# Patient Record
Sex: FEMALE | Race: WHITE | NOT HISPANIC OR LATINO | Employment: OTHER | URBAN - METROPOLITAN AREA
[De-identification: names, ages, dates, MRNs, and addresses within clinical notes are randomized per-mention and may not be internally consistent; named-entity substitution may affect disease eponyms.]

---

## 2021-11-23 ENCOUNTER — HOSPITAL ENCOUNTER (INPATIENT)
Facility: CLINIC | Age: 66
LOS: 6 days | Discharge: LEFT BEFORE DISCHARGE | DRG: 291 | End: 2021-11-30
Attending: EMERGENCY MEDICINE | Admitting: INTERNAL MEDICINE
Payer: COMMERCIAL

## 2021-11-23 ENCOUNTER — APPOINTMENT (OUTPATIENT)
Dept: CT IMAGING | Facility: CLINIC | Age: 66
DRG: 291 | End: 2021-11-23
Attending: EMERGENCY MEDICINE
Payer: COMMERCIAL

## 2021-11-23 DIAGNOSIS — R55 SYNCOPE, UNSPECIFIED SYNCOPE TYPE: ICD-10-CM

## 2021-11-23 DIAGNOSIS — L30.4 INTERTRIGO: ICD-10-CM

## 2021-11-23 DIAGNOSIS — J45.901 EXACERBATION OF ASTHMA, UNSPECIFIED ASTHMA SEVERITY, UNSPECIFIED WHETHER PERSISTENT: ICD-10-CM

## 2021-11-23 DIAGNOSIS — I50.9 ACUTE CONGESTIVE HEART FAILURE, UNSPECIFIED HEART FAILURE TYPE (H): ICD-10-CM

## 2021-11-23 DIAGNOSIS — H04.123 DRY EYES: Primary | ICD-10-CM

## 2021-11-23 LAB
ALBUMIN SERPL-MCNC: 2.6 G/DL (ref 3.4–5)
ALP SERPL-CCNC: 153 U/L (ref 40–150)
ALT SERPL W P-5'-P-CCNC: 14 U/L (ref 0–50)
ANION GAP SERPL CALCULATED.3IONS-SCNC: 5 MMOL/L (ref 3–14)
AST SERPL W P-5'-P-CCNC: 15 U/L (ref 0–45)
BASOPHILS # BLD AUTO: 0 10E3/UL (ref 0–0.2)
BASOPHILS NFR BLD AUTO: 1 %
BILIRUB SERPL-MCNC: 1.3 MG/DL (ref 0.2–1.3)
BUN SERPL-MCNC: 7 MG/DL (ref 7–30)
CALCIUM SERPL-MCNC: 8.2 MG/DL (ref 8.5–10.1)
CHLORIDE BLD-SCNC: 104 MMOL/L (ref 94–109)
CO2 SERPL-SCNC: 30 MMOL/L (ref 20–32)
CREAT SERPL-MCNC: 0.89 MG/DL (ref 0.52–1.04)
D DIMER PPP FEU-MCNC: 1.22 UG/ML FEU (ref 0–0.5)
EOSINOPHIL # BLD AUTO: 0 10E3/UL (ref 0–0.7)
EOSINOPHIL NFR BLD AUTO: 1 %
ERYTHROCYTE [DISTWIDTH] IN BLOOD BY AUTOMATED COUNT: 18.3 % (ref 10–15)
FLUAV RNA SPEC QL NAA+PROBE: NEGATIVE
FLUBV RNA RESP QL NAA+PROBE: NEGATIVE
GFR SERPL CREATININE-BSD FRML MDRD: 67 ML/MIN/1.73M2
GLUCOSE BLD-MCNC: 106 MG/DL (ref 70–99)
HCO3 BLDV-SCNC: 35 MMOL/L (ref 21–28)
HCT VFR BLD AUTO: 52.6 % (ref 35–47)
HGB BLD-MCNC: 15.1 G/DL (ref 11.7–15.7)
HOLD SPECIMEN: NORMAL
IMM GRANULOCYTES # BLD: 0 10E3/UL
IMM GRANULOCYTES NFR BLD: 0 %
LACTATE BLD-SCNC: 1.2 MMOL/L
LYMPHOCYTES # BLD AUTO: 0.4 10E3/UL (ref 0.8–5.3)
LYMPHOCYTES NFR BLD AUTO: 6 %
MCH RBC QN AUTO: 26.3 PG (ref 26.5–33)
MCHC RBC AUTO-ENTMCNC: 28.7 G/DL (ref 31.5–36.5)
MCV RBC AUTO: 92 FL (ref 78–100)
MONOCYTES # BLD AUTO: 0.2 10E3/UL (ref 0–1.3)
MONOCYTES NFR BLD AUTO: 4 %
NEUTROPHILS # BLD AUTO: 5.3 10E3/UL (ref 1.6–8.3)
NEUTROPHILS NFR BLD AUTO: 88 %
NRBC # BLD AUTO: 0 10E3/UL
NRBC BLD AUTO-RTO: 0 /100
NT-PROBNP SERPL-MCNC: 5390 PG/ML (ref 0–900)
PCO2 BLDV: 70 MM HG (ref 40–50)
PH BLDV: 7.31 [PH] (ref 7.32–7.43)
PLATELET # BLD AUTO: 242 10E3/UL (ref 150–450)
PO2 BLDV: 31 MM HG (ref 25–47)
POTASSIUM BLD-SCNC: 4.5 MMOL/L (ref 3.4–5.3)
PROT SERPL-MCNC: 7 G/DL (ref 6.8–8.8)
RBC # BLD AUTO: 5.74 10E6/UL (ref 3.8–5.2)
SAO2 % BLDV: 51 % (ref 94–100)
SARS-COV-2 RNA RESP QL NAA+PROBE: NEGATIVE
SODIUM SERPL-SCNC: 139 MMOL/L (ref 133–144)
TROPONIN I SERPL HS-MCNC: 15 NG/L
TROPONIN I SERPL-MCNC: <0.015 UG/L (ref 0–0.04)
WBC # BLD AUTO: 6 10E3/UL (ref 4–11)

## 2021-11-23 PROCEDURE — 82040 ASSAY OF SERUM ALBUMIN: CPT | Performed by: EMERGENCY MEDICINE

## 2021-11-23 PROCEDURE — 84484 ASSAY OF TROPONIN QUANT: CPT | Performed by: EMERGENCY MEDICINE

## 2021-11-23 PROCEDURE — 87181 SC STD AGAR DILUTION PER AGT: CPT | Performed by: EMERGENCY MEDICINE

## 2021-11-23 PROCEDURE — C9803 HOPD COVID-19 SPEC COLLECT: HCPCS

## 2021-11-23 PROCEDURE — 99285 EMERGENCY DEPT VISIT HI MDM: CPT | Mod: 25

## 2021-11-23 PROCEDURE — 36415 COLL VENOUS BLD VENIPUNCTURE: CPT | Performed by: EMERGENCY MEDICINE

## 2021-11-23 PROCEDURE — 83880 ASSAY OF NATRIURETIC PEPTIDE: CPT | Performed by: EMERGENCY MEDICINE

## 2021-11-23 PROCEDURE — 70450 CT HEAD/BRAIN W/O DYE: CPT | Mod: MG

## 2021-11-23 PROCEDURE — 93005 ELECTROCARDIOGRAM TRACING: CPT

## 2021-11-23 PROCEDURE — 87149 DNA/RNA DIRECT PROBE: CPT | Performed by: EMERGENCY MEDICINE

## 2021-11-23 PROCEDURE — 71275 CT ANGIOGRAPHY CHEST: CPT | Mod: ME

## 2021-11-23 PROCEDURE — 82803 BLOOD GASES ANY COMBINATION: CPT

## 2021-11-23 PROCEDURE — 85379 FIBRIN DEGRADATION QUANT: CPT | Performed by: EMERGENCY MEDICINE

## 2021-11-23 PROCEDURE — 250N000011 HC RX IP 250 OP 636: Performed by: EMERGENCY MEDICINE

## 2021-11-23 PROCEDURE — 85025 COMPLETE CBC W/AUTO DIFF WBC: CPT | Performed by: EMERGENCY MEDICINE

## 2021-11-23 PROCEDURE — 87636 SARSCOV2 & INF A&B AMP PRB: CPT | Performed by: EMERGENCY MEDICINE

## 2021-11-23 RX ORDER — IOPAMIDOL 755 MG/ML
500 INJECTION, SOLUTION INTRAVASCULAR ONCE
Status: COMPLETED | OUTPATIENT
Start: 2021-11-23 | End: 2021-11-23

## 2021-11-23 RX ORDER — IPRATROPIUM BROMIDE AND ALBUTEROL SULFATE 2.5; .5 MG/3ML; MG/3ML
6 SOLUTION RESPIRATORY (INHALATION) ONCE
Status: COMPLETED | OUTPATIENT
Start: 2021-11-23 | End: 2021-11-24

## 2021-11-23 RX ORDER — FUROSEMIDE 10 MG/ML
60 INJECTION INTRAMUSCULAR; INTRAVENOUS ONCE
Status: COMPLETED | OUTPATIENT
Start: 2021-11-23 | End: 2021-11-24

## 2021-11-23 RX ORDER — METHYLPREDNISOLONE SODIUM SUCCINATE 125 MG/2ML
125 INJECTION, POWDER, LYOPHILIZED, FOR SOLUTION INTRAMUSCULAR; INTRAVENOUS ONCE
Status: COMPLETED | OUTPATIENT
Start: 2021-11-23 | End: 2021-11-24

## 2021-11-23 RX ADMIN — IOPAMIDOL 80 ML: 755 INJECTION, SOLUTION INTRAVENOUS at 22:38

## 2021-11-24 ENCOUNTER — APPOINTMENT (OUTPATIENT)
Dept: CARDIOLOGY | Facility: CLINIC | Age: 66
DRG: 291 | End: 2021-11-24
Attending: INTERNAL MEDICINE
Payer: COMMERCIAL

## 2021-11-24 PROBLEM — I50.9 ACUTE CONGESTIVE HEART FAILURE, UNSPECIFIED HEART FAILURE TYPE (H): Status: ACTIVE | Noted: 2021-11-24

## 2021-11-24 PROBLEM — L30.4 INTERTRIGO: Status: ACTIVE | Noted: 2021-11-24

## 2021-11-24 PROBLEM — R55 SYNCOPE, UNSPECIFIED SYNCOPE TYPE: Status: ACTIVE | Noted: 2021-11-24

## 2021-11-24 PROBLEM — J45.901 EXACERBATION OF ASTHMA, UNSPECIFIED ASTHMA SEVERITY, UNSPECIFIED WHETHER PERSISTENT: Status: ACTIVE | Noted: 2021-11-24

## 2021-11-24 LAB
ANION GAP SERPL CALCULATED.3IONS-SCNC: 6 MMOL/L (ref 3–14)
ATRIAL RATE - MUSE: 94 BPM
BUN SERPL-MCNC: 9 MG/DL (ref 7–30)
CALCIUM SERPL-MCNC: 8.8 MG/DL (ref 8.5–10.1)
CHLORIDE BLD-SCNC: 102 MMOL/L (ref 94–109)
CO2 SERPL-SCNC: 32 MMOL/L (ref 20–32)
CREAT SERPL-MCNC: 0.89 MG/DL (ref 0.52–1.04)
DIASTOLIC BLOOD PRESSURE - MUSE: NORMAL MMHG
GFR SERPL CREATININE-BSD FRML MDRD: 67 ML/MIN/1.73M2
GLUCOSE BLD-MCNC: 139 MG/DL (ref 70–99)
INTERPRETATION ECG - MUSE: NORMAL
LVEF ECHO: NORMAL
P AXIS - MUSE: 67 DEGREES
POTASSIUM BLD-SCNC: 4.2 MMOL/L (ref 3.4–5.3)
PR INTERVAL - MUSE: 142 MS
QRS DURATION - MUSE: 86 MS
QT - MUSE: 364 MS
QTC - MUSE: 455 MS
R AXIS - MUSE: 90 DEGREES
SODIUM SERPL-SCNC: 140 MMOL/L (ref 133–144)
SYSTOLIC BLOOD PRESSURE - MUSE: NORMAL MMHG
T AXIS - MUSE: 38 DEGREES
VENTRICULAR RATE- MUSE: 94 BPM

## 2021-11-24 PROCEDURE — 250N000009 HC RX 250: Performed by: INTERNAL MEDICINE

## 2021-11-24 PROCEDURE — 96376 TX/PRO/DX INJ SAME DRUG ADON: CPT

## 2021-11-24 PROCEDURE — 250N000011 HC RX IP 250 OP 636: Performed by: INTERNAL MEDICINE

## 2021-11-24 PROCEDURE — 99207 PR NO BILLABLE SERVICE THIS VISIT: CPT | Mod: AI | Performed by: INTERNAL MEDICINE

## 2021-11-24 PROCEDURE — 99223 1ST HOSP IP/OBS HIGH 75: CPT | Mod: AI | Performed by: INTERNAL MEDICINE

## 2021-11-24 PROCEDURE — 94640 AIRWAY INHALATION TREATMENT: CPT

## 2021-11-24 PROCEDURE — 99231 SBSQ HOSP IP/OBS SF/LOW 25: CPT | Performed by: INTERNAL MEDICINE

## 2021-11-24 PROCEDURE — 93306 TTE W/DOPPLER COMPLETE: CPT | Mod: 26 | Performed by: INTERNAL MEDICINE

## 2021-11-24 PROCEDURE — 250N000011 HC RX IP 250 OP 636: Performed by: EMERGENCY MEDICINE

## 2021-11-24 PROCEDURE — 80048 BASIC METABOLIC PNL TOTAL CA: CPT | Performed by: INTERNAL MEDICINE

## 2021-11-24 PROCEDURE — 94640 AIRWAY INHALATION TREATMENT: CPT | Mod: 76

## 2021-11-24 PROCEDURE — 36415 COLL VENOUS BLD VENIPUNCTURE: CPT | Performed by: INTERNAL MEDICINE

## 2021-11-24 PROCEDURE — 250N000013 HC RX MED GY IP 250 OP 250 PS 637: Performed by: INTERNAL MEDICINE

## 2021-11-24 PROCEDURE — 999N000105 HC STATISTIC NO DOCUMENTATION TO SUPPORT CHARGE

## 2021-11-24 PROCEDURE — 96374 THER/PROPH/DIAG INJ IV PUSH: CPT

## 2021-11-24 PROCEDURE — 250N000009 HC RX 250: Performed by: EMERGENCY MEDICINE

## 2021-11-24 PROCEDURE — 120N000001 HC R&B MED SURG/OB

## 2021-11-24 PROCEDURE — 96375 TX/PRO/DX INJ NEW DRUG ADDON: CPT

## 2021-11-24 PROCEDURE — G0463 HOSPITAL OUTPT CLINIC VISIT: HCPCS

## 2021-11-24 PROCEDURE — 999N000157 HC STATISTIC RCP TIME EA 10 MIN

## 2021-11-24 PROCEDURE — 999N000208 ECHOCARDIOGRAM COMPLETE

## 2021-11-24 RX ORDER — ALBUTEROL SULFATE 0.83 MG/ML
2.5 SOLUTION RESPIRATORY (INHALATION)
Status: DISCONTINUED | OUTPATIENT
Start: 2021-11-24 | End: 2021-11-30 | Stop reason: HOSPADM

## 2021-11-24 RX ORDER — METHYLPREDNISOLONE SODIUM SUCCINATE 40 MG/ML
40 INJECTION, POWDER, LYOPHILIZED, FOR SOLUTION INTRAMUSCULAR; INTRAVENOUS EVERY 12 HOURS
Status: DISCONTINUED | OUTPATIENT
Start: 2021-11-24 | End: 2021-11-24

## 2021-11-24 RX ORDER — FUROSEMIDE 10 MG/ML
60 INJECTION INTRAMUSCULAR; INTRAVENOUS EVERY 8 HOURS
Status: DISCONTINUED | OUTPATIENT
Start: 2021-11-24 | End: 2021-11-25

## 2021-11-24 RX ORDER — HYDRALAZINE HYDROCHLORIDE 20 MG/ML
10 INJECTION INTRAMUSCULAR; INTRAVENOUS EVERY 4 HOURS PRN
Status: DISCONTINUED | OUTPATIENT
Start: 2021-11-24 | End: 2021-11-30 | Stop reason: HOSPADM

## 2021-11-24 RX ORDER — FUROSEMIDE 10 MG/ML
40 INJECTION INTRAMUSCULAR; INTRAVENOUS
Status: DISCONTINUED | OUTPATIENT
Start: 2021-11-24 | End: 2021-11-24

## 2021-11-24 RX ORDER — METHYLPREDNISOLONE SODIUM SUCCINATE 40 MG/ML
40 INJECTION, POWDER, LYOPHILIZED, FOR SOLUTION INTRAMUSCULAR; INTRAVENOUS EVERY 8 HOURS
Status: DISCONTINUED | OUTPATIENT
Start: 2021-11-24 | End: 2021-11-26

## 2021-11-24 RX ORDER — FLUCONAZOLE 150 MG/1
150 TABLET ORAL DAILY
Status: DISCONTINUED | OUTPATIENT
Start: 2021-11-24 | End: 2021-11-25

## 2021-11-24 RX ORDER — CODEINE PHOSPHATE AND GUAIFENESIN 10; 100 MG/5ML; MG/5ML
5 SOLUTION ORAL EVERY 4 HOURS PRN
Status: DISCONTINUED | OUTPATIENT
Start: 2021-11-24 | End: 2021-11-30 | Stop reason: HOSPADM

## 2021-11-24 RX ORDER — IPRATROPIUM BROMIDE AND ALBUTEROL SULFATE 2.5; .5 MG/3ML; MG/3ML
3 SOLUTION RESPIRATORY (INHALATION)
Status: DISCONTINUED | OUTPATIENT
Start: 2021-11-24 | End: 2021-11-30 | Stop reason: HOSPADM

## 2021-11-24 RX ORDER — ACETAMINOPHEN 650 MG/1
650 SUPPOSITORY RECTAL EVERY 6 HOURS PRN
Status: DISCONTINUED | OUTPATIENT
Start: 2021-11-24 | End: 2021-11-30 | Stop reason: HOSPADM

## 2021-11-24 RX ORDER — LIDOCAINE 40 MG/G
CREAM TOPICAL
Status: DISCONTINUED | OUTPATIENT
Start: 2021-11-24 | End: 2021-11-30 | Stop reason: HOSPADM

## 2021-11-24 RX ORDER — ACETAMINOPHEN 325 MG/1
650 TABLET ORAL EVERY 6 HOURS PRN
Status: DISCONTINUED | OUTPATIENT
Start: 2021-11-24 | End: 2021-11-30 | Stop reason: HOSPADM

## 2021-11-24 RX ORDER — HEPARIN SODIUM 5000 [USP'U]/.5ML
5000 INJECTION, SOLUTION INTRAVENOUS; SUBCUTANEOUS EVERY 12 HOURS
Status: DISCONTINUED | OUTPATIENT
Start: 2021-11-24 | End: 2021-11-30 | Stop reason: HOSPADM

## 2021-11-24 RX ORDER — NYSTATIN 100000 U/G
OINTMENT TOPICAL ONCE
Status: COMPLETED | OUTPATIENT
Start: 2021-11-24 | End: 2021-11-24

## 2021-11-24 RX ADMIN — ALBUTEROL SULFATE 2.5 MG: 2.5 SOLUTION RESPIRATORY (INHALATION) at 03:15

## 2021-11-24 RX ADMIN — FUROSEMIDE 60 MG: 10 INJECTION, SOLUTION INTRAMUSCULAR; INTRAVENOUS at 17:27

## 2021-11-24 RX ADMIN — FUROSEMIDE 40 MG: 10 INJECTION, SOLUTION INTRAMUSCULAR; INTRAVENOUS at 09:13

## 2021-11-24 RX ADMIN — IPRATROPIUM BROMIDE AND ALBUTEROL SULFATE 6 ML: .5; 3 SOLUTION RESPIRATORY (INHALATION) at 00:06

## 2021-11-24 RX ADMIN — METHYLPREDNISOLONE SODIUM SUCCINATE 125 MG: 125 INJECTION, POWDER, FOR SOLUTION INTRAMUSCULAR; INTRAVENOUS at 00:06

## 2021-11-24 RX ADMIN — IPRATROPIUM BROMIDE AND ALBUTEROL SULFATE 3 ML: 2.5; .5 SOLUTION RESPIRATORY (INHALATION) at 19:39

## 2021-11-24 RX ADMIN — METHYLPREDNISOLONE SODIUM SUCCINATE 40 MG: 40 INJECTION, POWDER, FOR SOLUTION INTRAMUSCULAR; INTRAVENOUS at 11:59

## 2021-11-24 RX ADMIN — IPRATROPIUM BROMIDE AND ALBUTEROL SULFATE 3 ML: 2.5; .5 SOLUTION RESPIRATORY (INHALATION) at 09:13

## 2021-11-24 RX ADMIN — IPRATROPIUM BROMIDE AND ALBUTEROL SULFATE 3 ML: 2.5; .5 SOLUTION RESPIRATORY (INHALATION) at 11:58

## 2021-11-24 RX ADMIN — HEPARIN SODIUM 5000 UNITS: 10000 INJECTION, SOLUTION INTRAVENOUS; SUBCUTANEOUS at 19:27

## 2021-11-24 RX ADMIN — HEPARIN SODIUM 5000 UNITS: 10000 INJECTION, SOLUTION INTRAVENOUS; SUBCUTANEOUS at 09:22

## 2021-11-24 RX ADMIN — FLUCONAZOLE 150 MG: 150 TABLET ORAL at 14:46

## 2021-11-24 RX ADMIN — IPRATROPIUM BROMIDE AND ALBUTEROL SULFATE 3 ML: 2.5; .5 SOLUTION RESPIRATORY (INHALATION) at 16:11

## 2021-11-24 RX ADMIN — NYSTATIN: 100000 OINTMENT TOPICAL at 07:59

## 2021-11-24 RX ADMIN — ALBUTEROL SULFATE 2.5 MG: 2.5 SOLUTION RESPIRATORY (INHALATION) at 07:53

## 2021-11-24 RX ADMIN — METHYLPREDNISOLONE SODIUM SUCCINATE 40 MG: 40 INJECTION, POWDER, FOR SOLUTION INTRAMUSCULAR; INTRAVENOUS at 19:27

## 2021-11-24 RX ADMIN — FUROSEMIDE 60 MG: 10 INJECTION, SOLUTION INTRAMUSCULAR; INTRAVENOUS at 00:07

## 2021-11-24 ASSESSMENT — ACTIVITIES OF DAILY LIVING (ADL)
ADLS_ACUITY_SCORE: 12
ADLS_ACUITY_SCORE: 12
ADLS_ACUITY_SCORE: 18
ADLS_ACUITY_SCORE: 20
ADLS_ACUITY_SCORE: 12
ADLS_ACUITY_SCORE: 18
ADLS_ACUITY_SCORE: 12
ADLS_ACUITY_SCORE: 12
ADLS_ACUITY_SCORE: 20
ADLS_ACUITY_SCORE: 18
ADLS_ACUITY_SCORE: 12
ADLS_ACUITY_SCORE: 20
ADLS_ACUITY_SCORE: 20
ADLS_ACUITY_SCORE: 12
ADLS_ACUITY_SCORE: 20
ADLS_ACUITY_SCORE: 18
ADLS_ACUITY_SCORE: 20
ADLS_ACUITY_SCORE: 20
ADLS_ACUITY_SCORE: 12
ADLS_ACUITY_SCORE: 18

## 2021-11-24 ASSESSMENT — ENCOUNTER SYMPTOMS
SHORTNESS OF BREATH: 1
VOMITING: 0
CHILLS: 0
COUGH: 1
FEVER: 0

## 2021-11-24 NOTE — ED NOTES
End of shift summary- 0200-0700  Dx: Syncope   A/O:x4 - drowsy   Diet: Low fat NA, No caffeine diet   Transfer: A2   Bathroom: I/O's - lasix. Urinating frequently  Pain: in legs   Treatment: Nebs, Lasix, Steroids, Heparin   Resp: Came from ED 31 on 4-6L NC, Now on 10L oxymask  Discharge Plans: tbd - from home         Blood pressure (!) 164/65, pulse 92, temperature 97.8  F (36.6  C), temperature source Oral, resp. rate 18, SpO2 94 %.

## 2021-11-24 NOTE — ED NOTES
Pt desatting into lower 80's. Bumped up to 6L, albuterol PRN given    Blood pressure (!) 160/93, pulse 92, temperature 97.8  F (36.6  C), temperature source Oral, resp. rate 9, SpO2 95 %.

## 2021-11-24 NOTE — PROGRESS NOTES
Patient was seen and examined by me today.  History and physical completed by Dr. Hutson was reviewed.  Patient is a 66-year-old female with no medical follow-up who presents with syncope.  Patient developed acute hypoxic respiratory failure, respiratory distress with evidence of emphysema and concern for diastolic congestive heart failure.  Problems    Acute respiratory with hypoxia likely multifactorial-COPD, CHF, morbid obesity    Has been on 15 L of oxygen via Oxymizer.  Manage BiPAP if she develops respiratory distress    Acute COPD exacerbation    Acute on chronic respiratory failure-hypercapnic    Acute congestive heart failure -diastolic.    Moderate to severe pulmonary hypertension likely from cor pulmonale    Morbid obesity    Difficult social situation    Plan    Continue IV Lasix, nebs, steroid, intake and output and daily weight monitoring.    Telemetry monitoring    Social service and care coordinator consult to assist with discharge needs.    Discussed with bedside nurse

## 2021-11-24 NOTE — CONSULTS
"WO Nurse Inpatient Wound Assessment   Reason for consultation: Evaluate and treat  Under breast, pannus, posterior right thigh and Left IT wounds    Assessment  Under breast and pannus wounds due to Moisture Associated Skin Damage (MASD) and Fungal  Status: initial assessment   Recommend provider order: Discussed with  MD to consider systemic Antifungal treatment   Severe fungal infection     Pressure Injury: on Right Posterior Thigh, present on admission    Pressure Injury is Stage 3   Contributing factor of the pressure injury: pressure and shear  Status: initial assessment     Pressure Injury: to Left IT,  present on admission   Pressure Injury is Stage 3  Contributing factor of the pressure injury:{Pressure and Sheer I  Status: initial assessment   Treatment Plan  Under Breast and Pannus  Wounds: BID  1 cleanse with soap and water and dry well  2. Apply thin layer of gissell Antifungal cream    Right Posterior Thigh  wounds: Daily   1. Cleanse with wound cleanser  2. Apply thin layer of  Sween  3. Cover with ABD    Left IT wounds: every 3 days   1 cleanse with wound cleanser dry  2. Apply Mepilex 4x4 dressing    Orders Written  Recommended provider order: None, at this time  WO Nurse follow-up plan:weekly  Nursing to notify the Provider(s) and re-consult the WO Nurse if wound(s) deteriorates or new skin concern.    Patient History  According to provider note(s): Annalisa Webb is a 66 year old female with history of asthma who presents via EMS with syncope and hypoxia. Patient reports of the last couple weeks she had increasing peripheral edema to point where the legs are now painful and have become a bit red. Pain is worsened by any sort of ambulation or touch. She denies any fever or nausea. She denies a history of CHF or renal disease but reports that she has not been seen by a physician in \"50 years.\"    Objective Data  Containment of urine/stool: External catheter    Active Diet Order  Orders Placed This " Encounter      Combination Diet Low Saturated Fat Na <2400mg Diet, No Caffeine Diet      Output:   I/O last 3 completed shifts:  In: -   Out: 2275 [Urine:2275]    Risk Assessment:   Sensory Perception: 3-->slightly limited  Moisture: 3-->occasionally moist  Activity: 2-->chairfast  Mobility: 2-->very limited  Nutrition: 3-->adequate  Friction and Shear: 2-->potential problem  Michael Score: 15                          Labs:   Recent Labs   Lab 11/23/21 2032   ALBUMIN 2.6*   HGB 15.1   WBC 6.0       Physical Exam  Areas of skin assessed: focused Under Breast and Pannus, Right Posterior Thigh, Left IT    Wound Location:  Under breast and pannus    Date of last photo 11/24/2021  Wound History: Patient reports she hasn't been able to care for areas well lately    Breast     Pannus groin folds extending to thighs    Wound Base: 100 % moist blanchable erythema with satellite lesions.     Palpation of the wound bed: normal      Drainage: none     Description of drainage: none     Measurements (length x width x depth, in cm) Breasts 12 cm  X 46 cm , Pannus extending to groin and anterior thighs is 16 cm x 60 cm      Tunneling none     Undermining N/A  Periwound skin: intact      Color: normal and consistent with surrounding tissue      Temperature: warm  Odor: none  Pain: absent, none  Pain intervention prior to dressing change: none     Wound Location:  Posterior Right Thigh    Date of last photo 11/24/21  Wound History: Chronic pressure injury      Wound Base: 1x1x0.2cm granular wound with scattered dry drainage surrounding over a [id number] area     Palpation of the wound bed: normal      Drainage: scant     Description of drainage: serous     Measurements (length x width x depth, in cm) see above     Tunneling N/A     Undermining N/A  Periwound skin: dry/scaly      Color: purple      Temperature: normal   Odor: none  Pain: no grimacing or signs of discomfort, none    Wound Location: Left IT     Date of last photo  11/24/21  Wound History: Chronic pressure injury    Wound Base: 50 % granulation tissue, 50% dry drainage     Palpation of the wound bed: normal      Drainage: scant     Description of drainage: serous     Measurements (length x width x depth, in cm) 4  x 4 cm area of scattered open areas      Tunneling N/A     Undermining N/A  Periwound skin: dry/scaly      Color: normal and consistent with surrounding tissue      Temperature: normal   Odor: none  Pain: absent, none  Pain intervention prior to dressing change:     Small scattered scabbed areas and agranular areas over bilateral buttocks and legs likely due to scratching and IAD.   Interventions  Visual inspection and assessment completed   Wound Care Rationale Protect periwound skin, Improve absorptive capacity, Promote moist wound healing without tissue dehydration , Provide protection  and Decrease bacterial load  Wound Care: done per plan of care  Supplies: reviewed  Current off-loading measures: Pillows under calves and Pillows  Current support surface: Standard  Atmos Air mattress-start puseate  Education provided to: importance of repositioning, plan of care, Infection prevention , Moisture management and Hygiene  Discussed plan of care with Patient, Nurse and Physician    Analy Momin RN [facility] Nursing Student     Cosigned Ramez Munroe RN CWOCN

## 2021-11-24 NOTE — ED TRIAGE NOTES
Pt presents via EMS after a syncopal episode at home. Unsure if she hit her head. Not on thinners. Complaint of generalized weakness, cough, BLE swelling and a breast infection. Found Hypoxic by EMS. 75% on RA. Currently on 6L NC at 96%. Not usually on oxygen at home. Not vaccinated for COVID.  ABCs intact A&Ox4 1 dose of albuterol given in route

## 2021-11-24 NOTE — PLAN OF CARE
Admitted to floor on 15L oxymask, O2 weaned to 2L oxymask sats 92%, otherwise VSS. A/O. Assist x2, gait belt. Tele: SR/ST. LS coarse, frequent cough. Tolerating low sodium diet. Purewick in place. Will continue POC.

## 2021-11-24 NOTE — CONSULTS
Care Management Initial Consult    General Information  Assessment completed with: Patient,    Type of CM/SW Visit: Initial Assessment    Primary Care Provider verified and updated as needed:     Readmission within the last 30 days:        Reason for Consult: community resources,discharge planning  Advance Care Planning:            Communication Assessment  Patient's communication style: spoken language (English or Bilingual)    Hearing Difficulty or Deaf: no   Wear Glasses or Blind: no    Cognitive  Cognitive/Neuro/Behavioral: WDL  Level of Consciousness: alert  Arousal Level: opens eyes spontaneously  Orientation: oriented x 4  Mood/Behavior: calm,cooperative  Best Language: 0 - No aphasia  Speech: clear    Living Environment:   People in home: alone     Current living Arrangements: apartment           Family/Social Support:  Care provided by: self  Provides care for: no one     None          Description of Support System:           Current Resources:   Patient receiving home care services: No     Community Resources: None  Equipment currently used at home: cane, straight  Supplies currently used at home:      Employment/Financial:         Lifestyle & Psychosocial Needs:  Social Determinants of Health     Tobacco Use: Not on file   Alcohol Use: Not on file   Financial Resource Strain: Not on file   Food Insecurity: Not on file   Transportation Needs: Not on file   Physical Activity: Not on file   Stress: Not on file   Social Connections: Not on file   Intimate Partner Violence: Not on file   Depression: Not on file   Housing Stability: Not on file       Functional Status:  Prior to admission patient needed assistance: no          Mental Health Status:  Mental Health Status: No Current Concerns       Chemical Dependency Status:  Chemical Dependency Status: No Current Concerns             Values/Beliefs:  Spiritual, Cultural Beliefs, Congregation Practices, Values that affect care:                 Additional  Information:  Met with pt who reports she lives alone in an apartment. She has no children, family or supports. She reports she uses a cane and has had 3 falls in the last 6 months. She reports she has been to a TCU in the past somewhere in [place] but couldn't remember the name.  She is agreeable to a TCU if needed and didn't have a preference.  She does not have any current services.      She does not have a PCP and is agreeable to letting the CC help her obtain a PCP. She would prefer a female doctor [place].    Plan:  Sw will continue to follow and assist with discharge planning.          Grazyna TALBERT, [facility]  Inpatient Care Coordination   [facility]   [phone]

## 2021-11-24 NOTE — ED PROVIDER NOTES
"  History   Chief Complaint:  Syncope       HPI   Annalisa Webb is a 66 year old female with history of asthma who presents via EMS with syncope and hypoxia. Patient reports of the last couple weeks she had increasing peripheral edema to point where the legs are now painful and have become a bit red. Pain is worsened by any sort of ambulation or touch. She denies any fever or nausea. She denies a history of CHF or renal disease but reports that she has not been seen by a physician in \"50 years.\"    Today she was walking to her mailbox when she began coughing. While coughing she suffered a syncopal episode. There was no preceding chest pain, shortness of breath or palpitations. There was no incontinence. There was no confusion upon awakening. This was unwitnessed. EMS was called and noted that her O2 saturations were 75% on room air. She states that she is \"always short of breath\" but feels no more short of breath than on average day. She has a history of asthma but no documented history of COPD. She smokes at least 1.5 packs per day    Review of Systems   Constitutional: Negative for chills and fever.   Respiratory: Positive for cough and shortness of breath.    Cardiovascular: Negative for chest pain.   Gastrointestinal: Negative for vomiting.   Skin: Positive for rash.   All other systems reviewed and are negative.    Allergies:  Prednisone  Erythromycin   Penicillins    Medications:  Abuterol    Past Medical History:     Asthma, mild intermittent   Tobacco user    Social History:  The patient was accompanied to the ER by EMS  The patient is not COVID vaccinated    Physical Exam     Patient Vitals for the past 24 hrs:   BP Temp Pulse Resp SpO2   11/23/21 2226 -- -- 85 19 98 %   11/23/21 2221 -- -- 87 23 100 %   11/23/21 2220 -- -- 86 17 100 %   11/23/21 2219 -- -- 87 19 100 %   11/23/21 2200 -- -- 86 28 97 %   11/23/21 2158 -- -- 86 25 99 %   11/23/21 2157 -- -- 87 23 94 %   11/23/21 2156 -- -- 87 22 98 % "   11/23/21 2014 (!) 165/132 98  F (36.7  C) 98 22 96 %       Physical Exam  Skin:               General:   Chronically ill female  HEENT:    Oropharynx is moist  Eyes:    Conjunctiva normal  Neck:    Supple, no meningismus.     CV:     Regular rate and rhythm.      No murmurs, rubs or gallops.       2+ radial pulses bilateral.       3+ bilateral lower extremity edema.  PULM:    Diffuse expiratory wheezing     No respiratory distress although tachypneic.      No rales     No stridor.  ABD:    Soft, non-tender, non-distended.       No pulsatile masses.       No rebound, guarding or rigidity.  MSK:     Lower extremities:      Diffuse profound edema      Poorly circumscribed erythema to lower extremities below the knees       Blanches to touch, tender and minimally warm       Erythematous changes are symmetric   LYMPH:   No cervical lymphadenopathy.  NEURO:   Alert. Good muscle tone, no atrophy.  Skin:    Warm, dry    Psych:    Mood is good and affect is appropriate.        Emergency Department Course   ECG  ECG obtained at 2058, ECG read at 2105  Normal sinus rhythm  Rightward axis  Borderline ECG   Rate 94 bpm. NE interval 142 ms. QRS duration 86 ms. QT/QTc 364/455 ms. P-R-T axes 67 90 38.     Imaging:    POC US ECHO LIMITED   Final Result   [facility] Procedure Note        Limited Bedside ED Cardiac Ultrasound:      PROCEDURE: PERFORMED BY: Dr. Nic Brito MD   INDICATIONS/SYMPTOM:  Hypoxia   PROBE: Cardiac phased array probe   BODY LOCATION: Chest   FINDINGS:    The ultrasound was performed utilizing the apical 4 chamber views.   Cardiac contractility:  Present   Gross estimation of cardiac kinesis: Technically difficult and unable to assess   Pericardial Effusion:  None   RV:LV ratio: LV > RV                                                     INTERPRETATION:    Technically difficult bedside ultrasound. No pericardial effusion. Unable to assess cardiac kinesis.    IMAGE DOCUMENTATION: Images were  archived to hard drive.              CT Chest Pulmonary Embolism w Contrast   Final Result   IMPRESSION:   1.  No acute pulmonary embolus.   2.  2 small bands of old retracted pulmonary embolus.   3.  No aortic aneurysm or dissection.   4.  Few areas of air trapping in the lungs bilaterally consistent with small airways disease.   5.  Few mildly enlarged right bilateral axillary lymph nodes.   6.  Cholelithiasis.      Head CT w/o contrast   Final Result   IMPRESSION:   1.  No CT evidence for acute intracranial hemorrhage or calvarial fracture.   2.  Chronic changes as above.        The above imaging workup was performed.   Report per radiology    Laboratory:  Labs Ordered and Resulted from Time of ED Arrival to Time of ED Departure   D DIMER QUANTITATIVE - Abnormal       Result Value    D-Dimer Quantitative 1.22 (*)    COMPREHENSIVE METABOLIC PANEL - Abnormal    Sodium 139      Potassium 4.5      Chloride 104      Carbon Dioxide (CO2) 30      Anion Gap 5      Urea Nitrogen 7      Creatinine 0.89      Calcium 8.2 (*)     Glucose 106 (*)     Alkaline Phosphatase 153 (*)     AST 15      ALT 14      Protein Total 7.0      Albumin 2.6 (*)     Bilirubin Total 1.3      GFR Estimate 67     NT PROBNP INPATIENT - Abnormal    N terminal Pro BNP Inpatient 5,390 (*)    CBC WITH PLATELETS AND DIFFERENTIAL - Abnormal    WBC Count 6.0      RBC Count 5.74 (*)     Hemoglobin 15.1      Hematocrit 52.6 (*)     MCV 92      MCH 26.3 (*)     MCHC 28.7 (*)     RDW 18.3 (*)     Platelet Count 242      % Neutrophils 88      % Lymphocytes 6      % Monocytes 4      % Eosinophils 1      % Basophils 1      % Immature Granulocytes 0      NRBCs per 100 WBC 0      Absolute Neutrophils 5.3      Absolute Lymphocytes 0.4 (*)     Absolute Monocytes 0.2      Absolute Eosinophils 0.0      Absolute Basophils 0.0      Absolute Immature Granulocytes 0.0      Absolute NRBCs 0.0     ISTAT GASES LACTATE VENOUS POCT - Abnormal    Lactic Acid POCT 1.2       Bicarbonate Venous POCT 35 (*)     O2 Sat, Venous POCT 51 (*)     pCO2V Venous POCT 70 (*)     pH Venous POCT 7.31 (*)     pO2 Venous POCT 31     INFLUENZA A/B & SARS-COV2 PCR MULTIPLEX - Normal    Influenza A PCR Negative      Influenza B PCR Negative      SARS CoV2 PCR Negative     TROPONIN I - Normal    Troponin I S 15     TROPONIN I - Normal    Troponin I <0.015     BLOOD CULTURE   BLOOD CULTURE      Emergency Department Course:  Reviewed:  I reviewed nursing notes, vitals and past medical history    Assessments:   I obtained history and examined the patient as noted above.    I rechecked the patient and explained findings.     Consults:   I spoke with Dr. Hutson of the hospitalist service from [facility] regarding patient's presentation, findings, and plan of care.    Interventions:  Lasix, 60 mg, IV  Duoneb, 6 mL, Nebulization  Methylprednisolone, 125 mg, IV    Disposition:  The patient was admitted to the hospital under the care of Dr. Hutson.     Impression & Plan     Medical Decision Makin-year-old female presented to the ED with syncopal episode but noted to be hypoxic on presentation. In regards to her hypoxia, she has clear bronchospasm. She has a history of asthma but likely has contributing COPD given her extensive tobacco history. Patient given bronchodilators and steroids. She is hypoxic as low as 84% and is requiring 3 L per nasal cannula. COVID swab negative. D-dimer elevated thus underwent CT scan of the chest which has ruled out PE, pneumonia, pneumothorax and pleural effusions.    In addition, she has signs of volume overload. BNP quite elevated. Bedside ultrasound was quite limited in its ability to evaluate for ejection fraction. I suspect she has heart failure likely contributing to her peripheral edema. Patient given IV Lasix. She does have erythematous patches of the lower extremities which are most consistent with volume dependent skin changes as opposed to developing  cellulitis.    In regards to her syncope, no evidence of cardiac conduction defects or dysrhythmia during ED course. CT scan without PE and aortic pathology. I believe syncope likely related to hypoxia and posttussive syncope    Patient also noted to have quite severe Candida skin changes to the inframammary area. Nystatin applied. Patient will be transferred to a monitored bed for further eval.    Diagnosis:    ICD-10-CM    1. Exacerbation of asthma, unspecified asthma severity, unspecified whether persistent  J45.901    2. Acute congestive heart failure, unspecified heart failure type (H)  I50.9    3. Syncope, unspecified syncope type  R55    4. Intertrigo  L30.4        Discharge Medications:  New Prescriptions    No medications on file       Scribe Disclosure:  I, Javon Beasley () and Clifford Henson (trainee), am serving as a scribe at 8:27 PM on 11/23/2021 to document services personally performed by Nic Brito MD based on my observations and the provider's statements to me.        Nic Brito MD  11/24/21 0033

## 2021-11-24 NOTE — PHARMACY-ADMISSION MEDICATION HISTORY
Admission medication history interview status for this patient is complete. See [facility] admission navigator for allergy information, prior to admission medications and immunization status.     Medication history interview done, indicate source(s): Patient  Medication history resources (including written lists, pill bottles, clinic record):None  Pharmacy: [facility]    Changes made to PTA medication list:  Added:   Changed:   Reported as Not Taking:   Removed: Albuterol Inhaler    Actions taken by pharmacist (provider contacted, etc):None     Additional medication history information:None    Medication reconciliation/reorder completed by provider prior to medication history?  No      Patient reports taking no meds at home.

## 2021-11-24 NOTE — H&P
Admitted: 2021    CHIEF COMPLAINT:  Syncope.    HISTORY OF PRESENT ILLNESS:  Mainly obtained from the patient.  This is a 66-year-old white female with a history of asthma who really has not had any medical care for the past 40 years and is not on any medications.  The patient apparently went to get her mail, which is maybe around 12-15 feet from her place of residence.  When she was getting back, she had a coughing fit and subsequently had a syncopal episode.  She denies any chest pain.  She has been having swelling in her legs, which has been getting progressively worse.  She denies any fevers or chills.  She does endorse shortness of breath, which has been progressively getting worse for a couple of years.  She has really not seen any physician for any of these complaints.  She also does endorse some orthopnea and possibly some PND.  She also does endorse some weight gain, though she is unable to quantify.  She has had at least 2 other episodes of syncope this year, though she is unable to give me a description.  She was seen by Dr. Brito here in the ER, and I discussed care with him.  I am asked to admit her for further evaluation.    PAST MEDICAL HISTORY:  Asthma.    PAST SURGICAL HISTORY:  Significant for surgery on the right arm.    MEDICATIONS:  She has not taken any medications for the past 20 years, to include her albuterol inhaler.    SOCIAL HISTORY:  She continues to smoke.  She smokes 1-1/2 packs and has been doing that for 40 years.  She lives alone.  She does not drink alcohol.    FAMILY HISTORY:  Significant for father who  from an MI.    REVIEW OF SYSTEMS:  She denies any active chest pain.  She denies any nausea or vomiting or diarrhea.  All other systems are reviewed and deemed unremarkable and negative.    PHYSICAL EXAMINATION:    VITAL SIGNS:  Her temperature is 98.  Her pulse is 65.  Her blood pressure is 165/132.  her respiratory rate is 26 with an O2 saturation of 92% on 6  liters.  GENERAL:  She is alert, awake, oriented x 3, disheveled and unkempt, in no acute distress, is quite obese.  HEENT:  Pupils equal, round, reactive to light and accommodative.  LUNGS:  Clear to auscultation bilaterally.  HEART:  Regular rate.  S1, S2 normal.  No murmurs or gallops.  ABDOMEN:  Soft, nontender, nondistended with good bowel sounds.  EXTREMITIES:  She has 2+ edema and erythema of her lower extremities.  She has decreased range of motion of her lower extremities, which she attributes to arthritis and walks with a cane.    SKIN:  She has intertrigo between her breasts.  LABORATORY DATA:  Lab work obtained here included a CMP which is grossly unremarkable other than an alkaline phosphatase of 153.  Her BNP is 5390.  Her troponin is less than 0.015.  Her lactic acid is 1.2.  A venous blood gas showed a pH of 7.31 with a pCO2 of 70.  On her CBC, her white cell count is 6.0, hemoglobin 15.1, hematocrit 52.6, platelet count of 262.  Her lymphocyte count is 0.4.  Her D-dimer was 1.22.  Blood cultures obtained in the ER are pending.  Fast COVID test as well as influenza test was read as negative.  CT of the head without contrast showed no CT evidence of acute intracranial hemorrhage or calvarial fracture, chronic changes.  She had a CT of the chest PE protocol, which showed the following:  No acute pulmonary embolus, 2 small bands of old, retracted pulmonary embolus, no aortic aneurysm or dissection, a few areas of airtrapping in the lungs consistent with small airway disease, few enlarged right bilateral axillary lymph nodes, cholelithiasis.  An EKG obtained on her shows the following:  Normal sinus rhythm at 94 beats per minute.    IMPRESSION AND PLAN:    1.  Syncope, etiology undetermined:  We will monitor her on telemetry.  Could be related to her coughing fit and vasovagal episode or neurocardiogenic.  2.  Acute hypoxic respiratory failure:  Her O2 saturation was noted to be 75% on room air by EMS.   She is on supplemental oxygen here.  I think this is likely multifactorial, to include obesity with possibly pulmonary hypertension, COPD and congestive heart failure.  We will treat her by giving her steroids for now along with breathing treatments to include DuoNebs.  We will monitor on telemetry.  We will get an echocardiogram.  We will empirically diurese her with IV Lasix.  3.  Difficult social situation:  The patient lives alone, has difficulty obtaining groceries, is disheveled and has had no medical care.  We will get  involved.    CODE STATUS:  DNR/DNI.    She will be admitted as an inpatient.    Garry Hutson MD        D: 2021   T: 2021   MT: CHERYL    Name:     MICHAEL HESTER  MRN:      [id number]        Account:     [id number]   :      1955           Admitted:    2021       Document: [id number]

## 2021-11-25 ENCOUNTER — APPOINTMENT (OUTPATIENT)
Dept: PHYSICAL THERAPY | Facility: CLINIC | Age: 66
DRG: 291 | End: 2021-11-25
Attending: INTERNAL MEDICINE
Payer: COMMERCIAL

## 2021-11-25 LAB
ANION GAP SERPL CALCULATED.3IONS-SCNC: 3 MMOL/L (ref 3–14)
BASOPHILS # BLD AUTO: 0 10E3/UL (ref 0–0.2)
BASOPHILS NFR BLD AUTO: 0 %
BUN SERPL-MCNC: 20 MG/DL (ref 7–30)
CALCIUM SERPL-MCNC: 7.9 MG/DL (ref 8.5–10.1)
CHLORIDE BLD-SCNC: 99 MMOL/L (ref 94–109)
CO2 SERPL-SCNC: 38 MMOL/L (ref 20–32)
CREAT SERPL-MCNC: 1.16 MG/DL (ref 0.52–1.04)
ENTEROCOCCUS FAECALIS: NOT DETECTED
ENTEROCOCCUS FAECIUM: NOT DETECTED
EOSINOPHIL # BLD AUTO: 0 10E3/UL (ref 0–0.7)
EOSINOPHIL NFR BLD AUTO: 0 %
ERYTHROCYTE [DISTWIDTH] IN BLOOD BY AUTOMATED COUNT: 18.1 % (ref 10–15)
GFR SERPL CREATININE-BSD FRML MDRD: 48 ML/MIN/1.73M2
GLUCOSE BLD-MCNC: 152 MG/DL (ref 70–99)
HCT VFR BLD AUTO: 50.9 % (ref 35–47)
HGB BLD-MCNC: 14.5 G/DL (ref 11.7–15.7)
IMM GRANULOCYTES # BLD: 0 10E3/UL
IMM GRANULOCYTES NFR BLD: 0 %
LACTATE SERPL-SCNC: 2.7 MMOL/L (ref 0.7–2)
LISTERIA SPECIES (DETECTED/NOT DETECTED): NOT DETECTED
LYMPHOCYTES # BLD AUTO: 0.1 10E3/UL (ref 0.8–5.3)
LYMPHOCYTES NFR BLD AUTO: 2 %
MCH RBC QN AUTO: 26.5 PG (ref 26.5–33)
MCHC RBC AUTO-ENTMCNC: 28.5 G/DL (ref 31.5–36.5)
MCV RBC AUTO: 93 FL (ref 78–100)
MONOCYTES # BLD AUTO: 0.1 10E3/UL (ref 0–1.3)
MONOCYTES NFR BLD AUTO: 1 %
NEUTROPHILS # BLD AUTO: 5.3 10E3/UL (ref 1.6–8.3)
NEUTROPHILS NFR BLD AUTO: 97 %
NRBC # BLD AUTO: 0 10E3/UL
NRBC BLD AUTO-RTO: 0 /100
PLATELET # BLD AUTO: 223 10E3/UL (ref 150–450)
POTASSIUM BLD-SCNC: 4 MMOL/L (ref 3.4–5.3)
RBC # BLD AUTO: 5.47 10E6/UL (ref 3.8–5.2)
SODIUM SERPL-SCNC: 140 MMOL/L (ref 133–144)
STAPHYLOCOCCUS AUREUS: NOT DETECTED
STAPHYLOCOCCUS EPIDERMIDIS: NOT DETECTED
STAPHYLOCOCCUS LUGDUNENSIS: NOT DETECTED
STAPHYLOCOCCUS SPECIES: NOT DETECTED
STREPTOCOCCUS AGALACTIAE: NOT DETECTED
STREPTOCOCCUS ANGINOSUS GROUP: NOT DETECTED
STREPTOCOCCUS PNEUMONIAE: NOT DETECTED
STREPTOCOCCUS PYOGENES: NOT DETECTED
STREPTOCOCCUS SPECIES: NOT DETECTED
WBC # BLD AUTO: 5.6 10E3/UL (ref 4–11)

## 2021-11-25 PROCEDURE — 999N000157 HC STATISTIC RCP TIME EA 10 MIN

## 2021-11-25 PROCEDURE — 99233 SBSQ HOSP IP/OBS HIGH 50: CPT | Performed by: INTERNAL MEDICINE

## 2021-11-25 PROCEDURE — 83605 ASSAY OF LACTIC ACID: CPT | Performed by: INTERNAL MEDICINE

## 2021-11-25 PROCEDURE — 250N000011 HC RX IP 250 OP 636: Performed by: INTERNAL MEDICINE

## 2021-11-25 PROCEDURE — 94640 AIRWAY INHALATION TREATMENT: CPT | Mod: 76

## 2021-11-25 PROCEDURE — 250N000009 HC RX 250: Performed by: INTERNAL MEDICINE

## 2021-11-25 PROCEDURE — 97530 THERAPEUTIC ACTIVITIES: CPT | Mod: GP | Performed by: PHYSICAL THERAPIST

## 2021-11-25 PROCEDURE — 120N000001 HC R&B MED SURG/OB

## 2021-11-25 PROCEDURE — 36415 COLL VENOUS BLD VENIPUNCTURE: CPT | Performed by: INTERNAL MEDICINE

## 2021-11-25 PROCEDURE — 94640 AIRWAY INHALATION TREATMENT: CPT

## 2021-11-25 PROCEDURE — 97162 PT EVAL MOD COMPLEX 30 MIN: CPT | Mod: GP | Performed by: PHYSICAL THERAPIST

## 2021-11-25 PROCEDURE — 85025 COMPLETE CBC W/AUTO DIFF WBC: CPT | Performed by: INTERNAL MEDICINE

## 2021-11-25 PROCEDURE — 80048 BASIC METABOLIC PNL TOTAL CA: CPT | Performed by: INTERNAL MEDICINE

## 2021-11-25 PROCEDURE — 250N000013 HC RX MED GY IP 250 OP 250 PS 637: Performed by: INTERNAL MEDICINE

## 2021-11-25 RX ORDER — FUROSEMIDE 10 MG/ML
40 INJECTION INTRAMUSCULAR; INTRAVENOUS EVERY 8 HOURS
Status: DISCONTINUED | OUTPATIENT
Start: 2021-11-25 | End: 2021-11-27

## 2021-11-25 RX ORDER — FLUCONAZOLE 150 MG/1
150 TABLET ORAL
Status: DISCONTINUED | OUTPATIENT
Start: 2021-12-02 | End: 2021-11-30 | Stop reason: HOSPADM

## 2021-11-25 RX ADMIN — FLUCONAZOLE 150 MG: 150 TABLET ORAL at 09:50

## 2021-11-25 RX ADMIN — IPRATROPIUM BROMIDE AND ALBUTEROL SULFATE 3 ML: 2.5; .5 SOLUTION RESPIRATORY (INHALATION) at 15:12

## 2021-11-25 RX ADMIN — METHYLPREDNISOLONE SODIUM SUCCINATE 40 MG: 40 INJECTION, POWDER, FOR SOLUTION INTRAMUSCULAR; INTRAVENOUS at 06:26

## 2021-11-25 RX ADMIN — GUAIFENESIN AND CODEINE PHOSPHATE 5 ML: 10; 100 LIQUID ORAL at 00:27

## 2021-11-25 RX ADMIN — IPRATROPIUM BROMIDE AND ALBUTEROL SULFATE 3 ML: 2.5; .5 SOLUTION RESPIRATORY (INHALATION) at 11:27

## 2021-11-25 RX ADMIN — METHYLPREDNISOLONE SODIUM SUCCINATE 40 MG: 40 INJECTION, POWDER, FOR SOLUTION INTRAMUSCULAR; INTRAVENOUS at 21:00

## 2021-11-25 RX ADMIN — METHYLPREDNISOLONE SODIUM SUCCINATE 40 MG: 40 INJECTION, POWDER, FOR SOLUTION INTRAMUSCULAR; INTRAVENOUS at 13:23

## 2021-11-25 RX ADMIN — FUROSEMIDE 40 MG: 10 INJECTION, SOLUTION INTRAMUSCULAR; INTRAVENOUS at 16:45

## 2021-11-25 RX ADMIN — HEPARIN SODIUM 5000 UNITS: 10000 INJECTION, SOLUTION INTRAVENOUS; SUBCUTANEOUS at 21:26

## 2021-11-25 RX ADMIN — HEPARIN SODIUM 5000 UNITS: 10000 INJECTION, SOLUTION INTRAVENOUS; SUBCUTANEOUS at 09:48

## 2021-11-25 RX ADMIN — IPRATROPIUM BROMIDE AND ALBUTEROL SULFATE 3 ML: 2.5; .5 SOLUTION RESPIRATORY (INHALATION) at 07:21

## 2021-11-25 RX ADMIN — FUROSEMIDE 60 MG: 10 INJECTION, SOLUTION INTRAMUSCULAR; INTRAVENOUS at 09:49

## 2021-11-25 RX ADMIN — FUROSEMIDE 60 MG: 10 INJECTION, SOLUTION INTRAMUSCULAR; INTRAVENOUS at 01:00

## 2021-11-25 ASSESSMENT — ACTIVITIES OF DAILY LIVING (ADL)
ADLS_ACUITY_SCORE: 12
ADLS_ACUITY_SCORE: 8
ADLS_ACUITY_SCORE: 12
ADLS_ACUITY_SCORE: 10
ADLS_ACUITY_SCORE: 12
ADLS_ACUITY_SCORE: 10
ADLS_ACUITY_SCORE: 10
ADLS_ACUITY_SCORE: 12
ADLS_ACUITY_SCORE: 8
ADLS_ACUITY_SCORE: 12
ADLS_ACUITY_SCORE: 12
ADLS_ACUITY_SCORE: 8
ADLS_ACUITY_SCORE: 12
ADLS_ACUITY_SCORE: 12
ADLS_ACUITY_SCORE: 10

## 2021-11-25 NOTE — PROGRESS NOTES
SPIRITUAL HEALTH SERVICES Progress Note  [facility] 3    Spiritual Health Services consult order for assistance with healthcare directive.  Provided informational resources and education related to directive.  Assisted pt in completing document. Pt has no healthcare agent.    Today is a holiday and so notary service is not available.      Plan: Will attempt to schedule notary services tomorrow.  [facility] is available for additional consultation/assistance with healthcare directive.    Stoney George MA  Staff   Pager: [phone]  Phone: *[phone]  Off Mondays

## 2021-11-25 NOTE — PROGRESS NOTES
11/25/21 1330   Quick Adds   Type of Visit Initial PT Evaluation   Living Environment   Living Environment Comments Pt lives in an apartment, 1st floor. No stairs manage, has ramp entrance. Typically uses 2 cane for mobility all the time the past three month d/t increased LE pain.   Self-Care   Usual Activity Tolerance moderate   Current Activity Tolerance fair   Equipment Currently Used at Home cane, straight;tub bench;raised toilet seat   Disability/Function   Fall history within last six months yes   Number of times patient has fallen within last six months 3   General Information   Onset of Illness/Injury or Date of Surgery 11/23/21   Referring Physician Jorge Jeronimo MD   Patient/Family Therapy Goals Statement (PT) To return home   Pertinent History of Current Problem (include personal factors and/or comorbidities that impact the POC) Pt is a 66-year-old female with no medical follow-up who presents with syncope.  Patient developed acute hypoxic respiratory failure, respiratory distress with evidence of emphysema and concern for diastolic congestive heart failure.   Weight-Bearing Status - LLE full weight-bearing   Weight-Bearing Status - RLE full weight-bearing   Cognition   Orientation Status (Cognition) oriented x 4   Affect/Mental Status (Cognition) WFL   Follows Commands (Cognition) WFL   Pain Assessment   Patient Currently in Pain Yes, see Vital Sign flowsheet  (B LE pressure in standing)   Range of Motion (ROM)   ROM Comment B LE WFL   Strength   Strength Comments able to complete B SLR   Bed Mobility   Comment (Bed Mobility) Jana  supine <> sit   Transfers   Transfer Safety Comments Jana sit <> stand with FWW   Gait/Stairs (Locomotion)   Distance in Feet (Required for LE Total Joints) 10   Pattern (Gait) step-to   Deviations/Abnormal Patterns (Gait) base of support, wide;antalgic;gait speed decreased;weight shifting decreased   Comment (Gait/Stairs) CGA with FWW   Balance   Balance Comments good  sitting balance, fair standing with FWW   Clinical Impression   Criteria for Skilled Therapeutic Intervention yes, treatment indicated   PT Diagnosis (PT) impaired functional mobility   Influenced by the following impairments decreased standing balance; LE strength   Functional limitations due to impairments impaired ambulation, transfers, bed mobility   Clinical Presentation Evolving/Changing   Clinical Presentation Rationale Pt's respiratory needs, functional status, medical history, limited social support   Clinical Decision Making (Complexity) moderate complexity   Therapy Frequency (PT) 5x/week   Predicted Duration of Therapy Intervention (days/wks) 3 days   Planned Therapy Interventions (PT) balance training;bed mobility training;gait training;home exercise program;patient/family education;strengthening;transfer training   Anticipated Equipment Needs at Discharge (PT) walker, rolling   Risk & Benefits of therapy have been explained evaluation/treatment results reviewed;care plan/treatment goals reviewed;risks/benefits reviewed;current/potential barriers reviewed;participants voiced agreement with care plan;participants included;patient   PT Discharge Planning    PT Discharge Recommendation (DC Rec) Transitional Care Facility;home with home care physical therapy;home with assist   PT Rationale for DC Rec Pt is below baseline level of mobility, would benefit from continued skilled PT services in TCU setting. Pt lives alone with no social support. Currently needing Ax1 with all mobility, would need this to return home safely at this time. If patient d/c home, would benefit from HHPT and use of FWW for all mobility.    PT Brief overview of current status  Ax1 with FWW   Total Evaluation Time   Total Evaluation Time (Minutes) 8

## 2021-11-25 NOTE — PLAN OF CARE
VSS. SPO2 94-98% Oxygen has been adjusted few times on this shift Pt  drops when asleep now on 5 LPM NC. LS: clear/Diminished. Tele: SR. Afebrile. Infrequent cough with episodes of coughing fits. Denies pain.BLE edema +3.  A&OX4. UP A2 with Rosa steady. Pt redness/yeast /rash to perineum/abdominal folds/ under the breasts cleaned antifungal cream applied. Wound to right posterior thigh  Cleaned with wound cleanser dressing applied, wound to left buttock mepilex applied. Pulsate mattress in place, Pt on lasix Pure wick in place. PIV to the right arm SL. Continue monitoring & POC.

## 2021-11-25 NOTE — PLAN OF CARE
Orientation:  X4  VS: WDL.    Pain: Denies  Activity: Stand by   Resp: Oxygen increased to 7lpm. SpO2 dropped to low 80's while sleeping. Unproductive cough.   GI:  WDL  : WDL    Lines: SL  Plan:   Discharge TBD

## 2021-11-25 NOTE — PLAN OF CARE
Pt is stable on 8L oxymizer today.  She is tolerating all meals. Cough can be broncho spastic at times.  Offered Robitussin, pt declined.  Up to chair after  working with PT.  SW follows for TCU possibility.  BLEs are red-tender.  Will continue to monitor patient and continue plan with nebs, steroids, lasix.  Pt has good urine output. Tele was SR.  /75 (BP Location: Left arm)   Pulse 98   Temp 96.9  F (36.1  C) (Oral)   Resp 22   Wt 120.1 kg (264 lb 12.8 oz)   SpO2 95%

## 2021-11-25 NOTE — PROGRESS NOTES
MD Critical Lab Acknowledgement    I was contacted for critical Diagnostic Studies and Labs value:    Lactate of 2.7  Abnormal result acknowledged.    EMR reviewed     Assessment/Problem :    Elevated lactate likely due to nebs     Plan/Recommendation:    Continue to monitor

## 2021-11-25 NOTE — PLAN OF CARE
Pt A/O x 4 and calm/cooperative and friendly. Denies pain. 02 at 7 L while sleeping due to desaturation. Tele SR. SBA. LS clear and dim. Continue POC.    Vitals: /57 (BP Location: Left arm)   Pulse 89   Temp (!) 96.5  F (35.8  C) (Axillary)   Resp 20   Wt 120.1 kg (264 lb 12.8 oz)   SpO2 97%   BMI= There is no height or weight on file to calculate BMI.

## 2021-11-26 ENCOUNTER — APPOINTMENT (OUTPATIENT)
Dept: PHYSICAL THERAPY | Facility: CLINIC | Age: 66
DRG: 291 | End: 2021-11-26
Payer: COMMERCIAL

## 2021-11-26 LAB
ALBUMIN SERPL-MCNC: 2.9 G/DL (ref 3.4–5)
ALP SERPL-CCNC: 124 U/L (ref 40–150)
ALT SERPL W P-5'-P-CCNC: 13 U/L (ref 0–50)
ANION GAP SERPL CALCULATED.3IONS-SCNC: 4 MMOL/L (ref 3–14)
AST SERPL W P-5'-P-CCNC: 13 U/L (ref 0–45)
BILIRUB SERPL-MCNC: 0.9 MG/DL (ref 0.2–1.3)
BUN SERPL-MCNC: 35 MG/DL (ref 7–30)
CALCIUM SERPL-MCNC: 8.2 MG/DL (ref 8.5–10.1)
CHLORIDE BLD-SCNC: 94 MMOL/L (ref 94–109)
CO2 SERPL-SCNC: 41 MMOL/L (ref 20–32)
CREAT SERPL-MCNC: 1.15 MG/DL (ref 0.52–1.04)
ERYTHROCYTE [DISTWIDTH] IN BLOOD BY AUTOMATED COUNT: 18.6 % (ref 10–15)
GFR SERPL CREATININE-BSD FRML MDRD: 49 ML/MIN/1.73M2
GLUCOSE BLD-MCNC: 147 MG/DL (ref 70–99)
HCT VFR BLD AUTO: 52.5 % (ref 35–47)
HGB BLD-MCNC: 15.3 G/DL (ref 11.7–15.7)
MCH RBC QN AUTO: 26.7 PG (ref 26.5–33)
MCHC RBC AUTO-ENTMCNC: 29.1 G/DL (ref 31.5–36.5)
MCV RBC AUTO: 92 FL (ref 78–100)
PLATELET # BLD AUTO: 234 10E3/UL (ref 150–450)
POTASSIUM BLD-SCNC: 3.7 MMOL/L (ref 3.4–5.3)
PROT SERPL-MCNC: 7.3 G/DL (ref 6.8–8.8)
RBC # BLD AUTO: 5.73 10E6/UL (ref 3.8–5.2)
SODIUM SERPL-SCNC: 139 MMOL/L (ref 133–144)
WBC # BLD AUTO: 4.2 10E3/UL (ref 4–11)

## 2021-11-26 PROCEDURE — 80053 COMPREHEN METABOLIC PANEL: CPT | Performed by: INTERNAL MEDICINE

## 2021-11-26 PROCEDURE — 250N000009 HC RX 250: Performed by: INTERNAL MEDICINE

## 2021-11-26 PROCEDURE — 87040 BLOOD CULTURE FOR BACTERIA: CPT | Performed by: INTERNAL MEDICINE

## 2021-11-26 PROCEDURE — 250N000013 HC RX MED GY IP 250 OP 250 PS 637: Performed by: INTERNAL MEDICINE

## 2021-11-26 PROCEDURE — 999N000157 HC STATISTIC RCP TIME EA 10 MIN

## 2021-11-26 PROCEDURE — 250N000012 HC RX MED GY IP 250 OP 636 PS 637: Performed by: INTERNAL MEDICINE

## 2021-11-26 PROCEDURE — 97530 THERAPEUTIC ACTIVITIES: CPT | Mod: GP | Performed by: PHYSICAL THERAPIST

## 2021-11-26 PROCEDURE — 94640 AIRWAY INHALATION TREATMENT: CPT | Mod: 76

## 2021-11-26 PROCEDURE — 85027 COMPLETE CBC AUTOMATED: CPT | Performed by: INTERNAL MEDICINE

## 2021-11-26 PROCEDURE — 94640 AIRWAY INHALATION TREATMENT: CPT

## 2021-11-26 PROCEDURE — 250N000011 HC RX IP 250 OP 636: Performed by: INTERNAL MEDICINE

## 2021-11-26 PROCEDURE — 120N000001 HC R&B MED SURG/OB

## 2021-11-26 PROCEDURE — 99233 SBSQ HOSP IP/OBS HIGH 50: CPT | Performed by: INTERNAL MEDICINE

## 2021-11-26 PROCEDURE — 36415 COLL VENOUS BLD VENIPUNCTURE: CPT | Performed by: INTERNAL MEDICINE

## 2021-11-26 RX ORDER — DOXYCYCLINE 100 MG/1
100 CAPSULE ORAL EVERY 12 HOURS SCHEDULED
Status: DISCONTINUED | OUTPATIENT
Start: 2021-11-26 | End: 2021-11-30 | Stop reason: HOSPADM

## 2021-11-26 RX ORDER — PREDNISONE 20 MG/1
40 TABLET ORAL DAILY
Status: DISCONTINUED | OUTPATIENT
Start: 2021-11-26 | End: 2021-11-26

## 2021-11-26 RX ORDER — MULTIVITAMIN,THERAPEUTIC
1 TABLET ORAL DAILY
Status: DISCONTINUED | OUTPATIENT
Start: 2021-11-26 | End: 2021-11-30 | Stop reason: HOSPADM

## 2021-11-26 RX ORDER — METHYLPREDNISOLONE SODIUM SUCCINATE 40 MG/ML
40 INJECTION, POWDER, LYOPHILIZED, FOR SOLUTION INTRAMUSCULAR; INTRAVENOUS EVERY 24 HOURS
Status: DISCONTINUED | OUTPATIENT
Start: 2021-11-26 | End: 2021-11-29

## 2021-11-26 RX ADMIN — IPRATROPIUM BROMIDE AND ALBUTEROL SULFATE 3 ML: 2.5; .5 SOLUTION RESPIRATORY (INHALATION) at 15:35

## 2021-11-26 RX ADMIN — IPRATROPIUM BROMIDE AND ALBUTEROL SULFATE 3 ML: 2.5; .5 SOLUTION RESPIRATORY (INHALATION) at 08:43

## 2021-11-26 RX ADMIN — METHYLPREDNISOLONE SODIUM SUCCINATE 40 MG: 40 INJECTION, POWDER, FOR SOLUTION INTRAMUSCULAR; INTRAVENOUS at 04:15

## 2021-11-26 RX ADMIN — FUROSEMIDE 40 MG: 10 INJECTION, SOLUTION INTRAMUSCULAR; INTRAVENOUS at 17:13

## 2021-11-26 RX ADMIN — IPRATROPIUM BROMIDE AND ALBUTEROL SULFATE 3 ML: 2.5; .5 SOLUTION RESPIRATORY (INHALATION) at 19:40

## 2021-11-26 RX ADMIN — FUROSEMIDE 40 MG: 10 INJECTION, SOLUTION INTRAMUSCULAR; INTRAVENOUS at 09:25

## 2021-11-26 RX ADMIN — HEPARIN SODIUM 5000 UNITS: 10000 INJECTION, SOLUTION INTRAVENOUS; SUBCUTANEOUS at 20:27

## 2021-11-26 RX ADMIN — DOXYCYCLINE HYCLATE 100 MG: 100 CAPSULE ORAL at 20:27

## 2021-11-26 RX ADMIN — THERA TABS 1 TABLET: TAB at 17:14

## 2021-11-26 RX ADMIN — HEPARIN SODIUM 5000 UNITS: 10000 INJECTION, SOLUTION INTRAVENOUS; SUBCUTANEOUS at 09:22

## 2021-11-26 RX ADMIN — METHYLPREDNISOLONE SODIUM SUCCINATE 40 MG: 40 INJECTION, POWDER, FOR SOLUTION INTRAMUSCULAR; INTRAVENOUS at 21:33

## 2021-11-26 RX ADMIN — DOXYCYCLINE HYCLATE 100 MG: 100 CAPSULE ORAL at 13:52

## 2021-11-26 RX ADMIN — FUROSEMIDE 40 MG: 10 INJECTION, SOLUTION INTRAMUSCULAR; INTRAVENOUS at 01:25

## 2021-11-26 ASSESSMENT — ACTIVITIES OF DAILY LIVING (ADL)
ADLS_ACUITY_SCORE: 10
ADLS_ACUITY_SCORE: 12
ADLS_ACUITY_SCORE: 10
ADLS_ACUITY_SCORE: 12
ADLS_ACUITY_SCORE: 10
ADLS_ACUITY_SCORE: 12
ADLS_ACUITY_SCORE: 12
ADLS_ACUITY_SCORE: 10
ADLS_ACUITY_SCORE: 12
ADLS_ACUITY_SCORE: 12
ADLS_ACUITY_SCORE: 10
ADLS_ACUITY_SCORE: 12

## 2021-11-26 NOTE — PROGRESS NOTES
Cross coverage:     1/2 blood cultures from 11/23 positive: Gram positive cocci in clusters. Verigene GP panel not detected. Afebrile. WBC 5.6. Hold on antibiotics at this time. Suspecting a contaminant given labs/vitals stable and Verigene not detected. Repeat blood culture in AM. Monitor symptoms and notify provider with acute changes.

## 2021-11-26 NOTE — PROGRESS NOTES
[facility]  Hospitalist Progress Note  Jorge Jeronimo MD 11/25/2021    Reason for Stay/active problem list      Acute on chronic respiratory failure with hypoxia and hypercapnia-multifactorial etiology    Acute COPD exacerbation    Acute CHF with preserved EF    Pulmonary hypertension    Coughing spell/syncope         Assessment and Plan:        Summary of Stay: This is a 66-year-old white female with a history of asthma who really has not had any medical care for the past 40 years and is not on any medications.  The patient apparently went to get her mail, which is maybe around 12-15 feet from her place of residence.  When she was getting back, she had a coughing fit and subsequently had a syncopal episode.    Problem List with Assessment and Plan      1. Acute on chronic respiratory failure with hypoxia and hypercapnia and respiratory acidosis     Patient is chronic smoker and would like to continue smoking despite advised to quit.  History of asthma/COPD.  Multifactorial etiology -morbidly obese, CHF, COPD, pulmonary hypertension    On supplemental oxygen currently at 8 L/min with Oxymizer    Continue supplemental oxygen, nebs, steroid, Lasix.    2. Acute COPD/asthma exacerbation      Continue nebs, steroid, supplemental oxygen.    Patient does not follow with primary care physician or pulmonologist.  Advised to quit smoking but she would like to continue smoking despite the risk.      3. Acute CHF with preserved EF-elevated BNP, leg edema and shortness of breath      Echocardiogram this admission showed EF of 60-65%.  Moderate to severe pulmonary hypertension.    Continue IV Lasix, monitor intake and output and daily weight    4.  Syncope/coughing spell:    Continue to monitor on telemetry, echo unremarkable except for pulmonary pretension     5.  Fungal dermatitis:    Significant fungal infection under breasts bilaterally with moisture associated skin damage    Wound care nurse following  and recommendation noted for topical management    Fluconazole 150 mg given you can be continued once a week for 4 weeks     6. Social situation:    Patient lives alone in an apartment and does not follow with Dr.  Apparently she she had not had medical her for a long time.    Social service and care coordinator involved to assist with discharge needs.      Plan for today:    Continue supplemental oxygen, wean down as tolerated    Continue current medication, decrease Lasix    Monitor kidney function    Acute measurement of intake and output as well as daily weight        LDA     Access : Peripheral     Murphy Catheter: Not present        FEN :    Orders Placed This Encounter      Combination Diet Low Saturated Fat Na <2400mg Diet, No Caffeine Diet           Intake/Output Summary (Last 24 hours) at 11/25/2021 1914  Last data filed at 11/25/2021 1647  Gross per 24 hour   Intake 663 ml   Output 1750 ml   Net -1087 ml          DVT Prophylaxis:     Heparin SQ    Code Status:      Full Code    Estimated discharge  disposition and plan: May discharge in the next 2 to 3 days if able to wean down oxygen to 3 L via nasal cannula.              Interval History (Subjective):        Patient is seen and examined by me today and medical record reviewed.Overnight events noted and care discussed with nursing staff.  She feels much better today.  Denies any chest pain or shortness of breath.  The coughing spell is much better.  No report of syncope                  Physical Exam:        Last Vital Signs:  /75 (BP Location: Left arm)   Pulse 98   Temp 96.9  F (36.1  C) (Oral)   Resp 22   Wt 120.1 kg (264 lb 12.8 oz)   SpO2 95%     I/O last 3 completed shifts:  In: 360 [P.O.:360]  Out: 1500 [Urine:1500]    Wt Readings from Last 5 Encounters:   11/25/21 120.1 kg (264 lb 12.8 oz)        Constitutional: Awake, alert, cooperative, no apparent distress     Respiratory: Clear to auscultation bilaterally, no crackles or wheezing    Cardiovascular: Regular rate and rhythm, normal S1 and S2, and no murmur noted   Abdomen: Normal bowel sounds, soft, non-distended, non-tender   Skin: No new rashes, no cyanosis, dry to touch   Neuro: Alert with  no new focal weakness   Extremities: 2+ edema, normal range of motion   Other(s):        All other systems: Negative          Medications:        All current medications were reviewed with changes reflected in problem list.         Data:      All new lab and imaging data was reviewed.      Data reviewed today: I reviewed all new labs and imaging results over the last 24 hours. I personally reviewed       Recent Labs   Lab 11/25/21 0520 11/23/21 2032   WBC 5.6 6.0   HGB 14.5 15.1   HCT 50.9* 52.6*   MCV 93 92    242     No results for input(s): CULT in the last 168 hours.  Recent Labs   Lab 11/25/21 0520 11/24/21  1044 11/23/21 2032    140 139   POTASSIUM 4.0 4.2 4.5   CHLORIDE 99 102 104   CO2 38* 32 30   ANIONGAP 3 6 5   * 139* 106*   BUN 20 9 7   CR 1.16* 0.89 0.89   GFRESTIMATED 48* 67 67   DONA 7.9* 8.8 8.2*   PROTTOTAL  --   --  7.0   ALBUMIN  --   --  2.6*   BILITOTAL  --   --  1.3   ALKPHOS  --   --  153*   AST  --   --  15   ALT  --   --  14       Recent Labs   Lab 11/25/21 0520 11/24/21  1044 11/23/21 2032   * 139* 106*       No results for input(s): INR in the last 168 hours.      Recent Labs   Lab 11/23/21 2059   TROPONIN <0.015       Recent Results (from the past 48 hour(s))   Head CT w/o contrast    Narrative    EXAM: CT HEAD W/O CONTRAST  LOCATION: [facility]  DATE/TIME: 11/23/2021 10:33 PM    INDICATION: Fall, head trauma w/ syncope  COMPARISON: 03/02/2014  TECHNIQUE: Routine CT Head without IV contrast. Multiplanar reformats. Dose reduction techniques were used.    FINDINGS:  INTRACRANIAL CONTENTS: No intracranial hemorrhage, extraaxial collection, or mass effect.  No CT evidence of acute infarct. Chronic encephalomalacia and gliosis  in the right parietal lobe and lateral right frontal lobe. Mild presumed chronic small vessel   ischemic changes. Normal ventricles and sulci.     VISUALIZED ORBITS/SINUSES/MASTOIDS: No intraorbital abnormality. Polypoid mucosal thickening in the maxillary sinuses with mild ethmoid mucosal thickening. No middle ear or mastoid effusion.    BONES/SOFT TISSUES: No acute calvarial fracture. Left temporalis muscle hematoma.      Impression    IMPRESSION:  1.  No CT evidence for acute intracranial hemorrhage or calvarial fracture.  2.  Chronic changes as above.   CT Chest Pulmonary Embolism w Contrast    Narrative    EXAM: CT CHEST PULMONARY EMBOLISM W CONTRAST  LOCATION: [facility]  DATE/TIME: 11/23/2021 10:34 PM    INDICATION: Syncope. Generalized weakness. Cough.  COMPARISON: 3/3/2014.  TECHNIQUE: CT chest pulmonary angiogram during arterial phase injection of IV contrast. Multiplanar reformats and MIP reconstructions were performed. Dose reduction techniques were used.   CONTRAST: 80 mL Isovue-370    FINDINGS:  ANGIOGRAM CHEST: There is a nonocclusive web-like embolus in a left lower lobe pulmonary artery. Minimal web like embolus in a branch of the right lower lobe pulmonary artery. No acute occlusive pulmonary emboli. Thoracic aorta is negative for   dissection. The heart is at the upper limits of normal in size.    LUNGS AND PLEURA: Mild nodular probable scarring the right upper lobe laterally. Few geographic areas of air trapping primarily in the upper lobes. No pneumothorax or pleural effusion.    MEDIASTINUM/AXILLAE: There is a 3 cm nodule at the inferior aspect of the left thyroid lobe extending into the superior mediastinum. Enlarged lymph node or soft tissue nodule in the inferior left axilla measuring 2.3 x 1.2 cm. Few mildly enlarged right   axillary lymph nodes. No mediastinal or hilar lymph node enlargement.    CORONARY ARTERY CALCIFICATION: Mild.    UPPER ABDOMEN: Stone in the  gallbladder.    MUSCULOSKELETAL: Degenerative disease in the spine.      Impression    IMPRESSION:  1.  No acute pulmonary embolus.  2.  2 small bands of old retracted pulmonary embolus.  3.  No aortic aneurysm or dissection.  4.  Few areas of air trapping in the lungs bilaterally consistent with small airways disease.  5.  Few mildly enlarged right bilateral axillary lymph nodes.  6.  Cholelithiasis.   POC US ECHO LIMITED    Impression    [facility] Procedure Note      Limited Bedside ED Cardiac Ultrasound:    PROCEDURE: PERFORMED BY: Dr. Nic Brito MD  INDICATIONS/SYMPTOM:  Hypoxia  PROBE: Cardiac phased array probe  BODY LOCATION: Chest  FINDINGS:   The ultrasound was performed utilizing the apical 4 chamber views.  Cardiac contractility:  Present  Gross estimation of cardiac kinesis: Technically difficult and unable to assess  Pericardial Effusion:  None  RV:LV ratio: LV > RV                                                  INTERPRETATION:    Technically difficult bedside ultrasound. No pericardial effusion. Unable to assess cardiac kinesis.   IMAGE DOCUMENTATION: Images were archived to hard drive.         Echocardiogram Complete   Result Value    LVEF  60-65%    Narrative    [id number]  [id number]  [id number]  [id number]     [facility]  Echocardiography Laboratory  [address] [place] [address]     Name: MICHAEL HESTER  MRN: [id number]  : 1955  Study Date: 2021 08:34 AM  Age: 66 yrs  Gender: Female  Patient Location: [facility]  Reason For Study: SOB  Ordering Physician: IVETTE HAINES  Performed By: Miley Donis     HR: 93  BP: 136/67 mmHg  ______________________________________________________________________________  Procedure  Complete Portable Echo Adult. Optison (NDC #[id number]) given intravenously.  ______________________________________________________________________________  Interpretation Summary     Technically very difficult  study.  1. Left ventricular systolic function is normal. The visual ejection fraction  is 60-65%.  2. The right ventricle is not well visualized; grossly normal size and  function.  3. There is mild (1+) tricuspid regurgitation.  4. Moderate to severe pulmonary hypertension; The right ventricular systolic  pressure is approximated at 53mmHg plus the right atrial pressure. IVC  diameter <2.1 cm collapsing >50% with sniff suggests a normal RA pressure of 3  mmHg.  ______________________________________________________________________________  Left Ventricle  The left ventricle is normal in size. Left ventricular systolic function is  normal. The visual ejection fraction is 60-65%. Grade I or early diastolic  dysfunction. No regional wall motion abnormalities noted.     Right Ventricle  The right ventricle is not well visualized. The right ventricle is normal in  size and function.     Atria  The left atrium is not well visualized. Right atrium not well visualized.     Mitral Valve  There is mild mitral annular calcification. The mitral valve is not well  visualized. There is trace mitral regurgitation.     Tricuspid Valve  The tricuspid valve is not well visualized. The tricuspid valve is not well  visualized, but is grossly normal. There is mild (1+) tricuspid regurgitation.  The right ventricular systolic pressure is approximated at 53mmHg plus the  right atrial pressure.     Aortic Valve  The aortic valve is trileaflet with aortic valve sclerosis. The aortic valve  is not well visualized.     Pulmonic Valve  The pulmonic valve is not well visualized. The pulmonic valve is not well  seen, but is grossly normal.     Vessels  Normal size aorta. IVC diameter <2.1 cm collapsing >50% with sniff suggests a  normal RA pressure of 3 mmHg.     Pericardium  There is no pericardial effusion.     Rhythm  Sinus rhythm was noted.     ______________________________________________________________________________  MMode/2D  Measurements & Calculations  IVSd: 1.2 cm  LVIDd: 4.0 cm  LVIDs: 2.0 cm  LVPWd: 1.0 cm  FS: 50.0 %  LV mass(C)d: 147.6 grams     LA dimension: 3.8 cm  asc Aorta Diam: 3.5 cm  LVOT diam: 1.8 cm  LVOT area: 2.5 cm2  LA Volume (BP): 69.0 ml  RWT: 0.52     Doppler Measurements & Calculations  MV E max mary: 115.0 cm/sec  MV A max mary: 127.0 cm/sec  MV E/A: 0.91  MV max P.2 mmHg  MV mean P.0 mmHg  MV V2 VTI: 28.2 cm  MVA(VTI): 2.2 cm2  MV P1/2t max mary: 126.0 cm/sec  MV P1/2t: 56.8 msec  MVA(P1/2t): 3.9 cm2  MV dec slope: 650.0 cm/sec2  MV dec time: 0.16 sec  LV V1 max P.5 mmHg  LV V1 max: 146.0 cm/sec  LV V1 VTI: 24.9 cm  SV(LVOT): 63.4 ml  TR max mary: 365.0 cm/sec  TR max P.3 mmHg     ______________________________________________________________________________  Report approved by: Sarita Baptiste 2021 02:56 PM             COVID Status:  COVID-19 PCR Results    COVID-19 PCR Results 21   SARS CoV2 PCR Negative      Comments are available for some flowsheets but are not being displayed.         COVID-19 Antibody Results, Testing for Immunity    COVID-19 Antibody Results, Testing for Immunity   No data to display.              Disclaimer: This note consists of symbols derived from keyboarding, dictation and/or voice recognition software. As a result, there may be errors in the script that have gone undetected. Please consider this when interpreting information found in this chart.

## 2021-11-26 NOTE — PLAN OF CARE
Aox4. C/o BLE pain. Ax1 w walker. Switched to 7L oxymask from oxymizer while pt sleeps. IV lasix, solumedrol. Redness in abdominal/groin folds. Purewick in place.     /80 (BP Location: Right arm)   Pulse 89   Temp 97.7  F (36.5  C) (Axillary)   Resp 20   Wt 113.6 kg (250 lb 6.4 oz)   SpO2 93%

## 2021-11-26 NOTE — PLAN OF CARE
/75 (BP Location: Left arm)   Pulse 98   Temp 96.9  F (36.1  C) (Oral)   Resp 22   Wt 120.1 kg (264 lb 12.8 oz)   SpO2 95%     NEURO:A/Ox3, disoriented to time  PAIN:C/O bilateral LE pain declines interventions  TELE:SR  IV:SL LA  RESPIRATORY:LS- diminished/ expiratory wheezes, CERRATO, 7 lpm per oximyzer sats lower 90s   GI:Hypoactive BS, denies nausea, reports passing gas  :Incontinent, Purewick in place for strict NEREYDA  SKIN:bruised, redness to dahiana area, coccyx and under breast bilaterally, red tim BLE, 2+ edema to BLE  ACTIVITY:Ax1 gait belt and walker  DIET:Cardiac no caffeine  PLAN: Continue IV lasix, Scheduled solumedrol and nebs, PT consulted, continue POC.

## 2021-11-26 NOTE — PROGRESS NOTES
SPIRITUAL HEALTH SERVICES Progress Note  [facility]    Spiritual Health Services consult order for assistance with healthcare directive.  Provided informational resources and education related to directive and assisted with completion of document.  Pt does not have or desire a healthcare agent.  Pt is aware that [facility] service is not available due to holiday.  She anticipates possible home care services and will ask for witness from home caregivers.      Stoney George MA  Staff   Pager: [phone]  Phone: *[phone]  Off Mondays

## 2021-11-26 NOTE — CONSULTS
"CLINICAL NUTRITION SERVICES  -  ASSESSMENT NOTE      RECOMMENDATIONS FOR MD/PROVIDER TO ORDER:   Low Na diet only   Recommendations Ordered by Registered Dietitian (RD):   Continue low-sodium diet  Maryann SEGUNDO  MVI   Future/Additional Recommendations:   Continue to provide nutrition education as needed   Malnutrition:   Malnutrition Diagnosis: Moderate malnutrition  In Context of:  Chronic illness or disease and environmental circumstances     REASON FOR ASSESSMENT  Annalisa Webb is a 66 year old female seen by Registered Dietitian for Provider Order - Patient has CHF does not know which are nonsalt-containing diet.    PMH: COPD, CHF with preserved LV function    NUTRITION HISTORY  - Information obtained from patient  - Patient is on a regular diet at home  Pt. Has VERY limited food access or support  - per MD note, pt. Eats \"salt-containing hotdogs, soups and a lot of potato chips on a daily basis\"  - no car, internet, or \"current\" phone  - lives alone, needs to wait up to 2 hours to take cab down the road to [employer], cannot afford very much food, then needs to wait 2 more hours for another cab to drive her home as she cannot afford to pay the  to wait for her outside while she shops. Since she has to wait so long for a cab, she cannot buy very much refrigerated or frozen food or it will spoil.  - difficulty getting food into apartment and preparing food  - pt. States that she cannot worry about eating the right things since she is more concerned about doing what she needs in order to survive.  - coughing fits noted during consult  - discussed with  pt.'s needs for Meals on Wheels, [employer], etc.    CURRENT NUTRITION ORDERS  Diet Order:     Low Saturated Fat/Low Cholesterol/2400 mg Sodium     Current Intake/Tolerance:  100% meal intakes    NUTRITION FOCUSED PHYSICAL ASSESSMENT FOR DIAGNOSING MALNUTRITION)  Yes         Observed:    No nutrition-related physical findings observed, Muscle " "wasting (refer to documentation in Malnutrition section) and Fluid retention (refer to documentation in Malnutrition section)    Obtained from Chart/Interdisciplinary Team:  \"tells me that salt and cigarettes are her only pleasures in life and she is probably not going to give up on those  A lot nutrition services still give her recommendations regarding nonsalty diet for CHF\"  - pedal edema    ANTHROPOMETRICS  Height: Data Unavailable  Weight: 250 lbs 6.4 oz  There is no height or weight on file to calculate BMI.  Weight Status: unable to calculate - visually pt. Appears > obese I  Weight History: weights below reflect diuresing  11/26/21 0300 113.6 kg (250 lb 6.4 oz)   11/25/21 0316 120.1 kg (264 lb 12.8 oz)   11/24/21 1322 122 kg (269 lb)     LABS  Labs reviewed  Labs:  Electrolytes  Potassium (mmol/L)   Date Value   11/26/2021 3.7   11/25/2021 4.0   11/24/2021 4.2    Blood Glucose  Glucose (mg/dL)   Date Value   11/26/2021 147 (H)   11/25/2021 152 (H)   11/24/2021 139 (H)   11/23/2021 106 (H)     Hemoglobin A1C (%)   Date Value   03/01/2014 6.0    Inflammatory Markers  WBC Count (10e3/uL)   Date Value   11/26/2021 4.2   11/25/2021 5.6   11/23/2021 6.0     Albumin (g/dL)   Date Value   11/26/2021 2.9 (L)   11/23/2021 2.6 (L)      Sodium (mmol/L)   Date Value   11/26/2021 139   11/25/2021 140   11/24/2021 140    Renal  Urea Nitrogen (mg/dL)   Date Value   11/26/2021 35 (H)   11/25/2021 20   11/24/2021 9     Creatinine (mg/dL)   Date Value   11/26/2021 1.15 (H)   11/25/2021 1.16 (H)   11/24/2021 0.89     Additional  Triglycerides (mg/dL)   Date Value   03/03/2014 199 (H)        MEDICATIONS  - furosemide, prednisone    ASSESSED NUTRITION NEEDS PER APPROVED PRACTICE GUIDELINES:    Dosing Weight 114 kg  Estimated Energy Needs: 6661-4979 kcals (14-17 Kcal/Kg)  Justification: obese  Estimated Protein Needs: 114-137 grams protein (1-1.2 g pro/Kg)  Justification: hypercatabolism with critical illness and preservation of " lean body mass  Estimated Fluid Needs: per provider pending fluid status    MALNUTRITION:  % Weight Loss:  None noted - pt. With fluid overload  % Intake:  Decreased intake does not meet criteria for malnutrition   Subcutaneous Fat Loss:  None observed - excess adiposity  Muscle Loss:  Temporal region moderate depletion and Clavicle bone region moderate depletion  Fluid Retention:  Moderate - in feet    Malnutrition Diagnosis: Moderate malnutrition  In Context of:  Chronic illness or disease and environmental circumstances    NUTRITION DIAGNOSIS:  Inadequate protein, micronutrient intake related to limited resources as evidenced by difficulty obtaining and preparing food.    NUTRITION INTERVENTIONS  Recommendations / Nutrition Prescription  Continue low-sodium diet  Maryann SEGUNDO  MVI    Implementation  Nutrition education: Provided education on the importance of a low sodium diet and how it effects fluid status and breathing  Composition of Meals and Snacks, Medical Food Supplement and Multivitamin/Mineral    Nutrition Goals  Meal intakes %    MONITORING AND EVALUATION:  Progress towards goals will be monitored and evaluated per protocol and Practice Guidelines and Food and Nutrition Knowledge/Skill]    Kristyn García RDN, LD  Clinical Dietitian

## 2021-11-26 NOTE — PLAN OF CARE
"Patient continues to have these \"cough attach's\" that leave her unable to catch her breath. Her face turns red and she becomes tachycardic. She also needs 5-7 Liters of O2 to keep her sats above 88%. When O2 was found not on her today, she was mid 60's. 3 mins to recover. Unaware that her oxygen was not on her face. She has edema, legs are tim. Her abdo folds, lateral upper thigh and rt buttock have wounds. Pure wick for frequency due to IV lasix. + blood cultures. Nebs. Continue POC.  "

## 2021-11-26 NOTE — PROVIDER NOTIFICATION
Just got a call from [facility] lab notifying of +BC collected 11/23 at 2032 from the RA identifying as gram + cocci in clusters.

## 2021-11-26 NOTE — PROVIDER NOTIFICATION
Update from [facility] lab regarding +BC collected from  at 2032. None of the common organisms were detected and need to wait for further growth. Thanks.

## 2021-11-26 NOTE — PROGRESS NOTES
Care Management Follow Up    Length of Stay (days): 2    Expected Discharge Date: 11/28/2021     Concerns to be Addressed: discharge planning,care coordination/care conferences     Patient plan of care discussed at interdisciplinary rounds: Yes    Anticipated Discharge Disposition: Transitional Care     Anticipated Discharge Services: Meals on Wheels,County Work[facility]  Anticipated Discharge DME: Oxygen    Patient/Family in Agreement with the Plan: yes    Referrals Placed by CM/SW: Post Acute Facilities  Private pay costs discussed: Not applicable    Additional Information:  CM following for discharge planning. TCU recommended.  Per previous CM/ SW assessment, no preference for TCU. Patient has [employer] insurance and is unvaccinated.  TCU referrals sent.     Will meet with patient when appropriate to discuss concerns and resources: Meals on Wheels, [employer], transportation, currently does not have phone or Internet service.       Bárbara Boston, RN      Bárbara Boston RN Case Manager  Inpatient Care Coordination  [facility]   [phone]

## 2021-11-26 NOTE — PROGRESS NOTES
Hospitalist Medicine Progress Note   [facility]       Annalisa Webb is a 66 year old with COPD, CHF with preserved LV function, pulmonary hypertension, asthma who unfortunately continues to smoke cigarettes and lives alone and having difficulty obtaining groceries eat salt-containing hotdogs, soups and a lot of potato chips on a daily basis given to [facility] 11/24/2021 with history of syncope with a coughing spell and in the hospital and was found to have acute hypoxic respiratory failure with oxygen saturations of 75% on room air before being transported and was treated both for his COPD exacerbation and acute CHF with CT scan of the chest showing 2 small bands of old retracted pulmonary embolus, cholelithiasis without any acute pulmonary embolism and signs of small airway disease without pneumonia echo showed LVEF of 60 to 65%       Date of Admission:  11/23/2021  Assessment & Plan     Acute exacerbation of COPD  Her oxygen requirements have been decreasing with her being on 5 L nasal cannula O2 at this time whereas earlier she was on 8 L  The signs of old pulmonary embolus without any acute pulmonary embolism  It will be difficult for treating this patient's with warfarin given the fact that she has difficulty moving around  She is on treatment with methylprednisone which will be changed to oral prednisone and tapered quickly given her CHF, duo nebs and not on antibiotics  We will start patient on doxycycline    Acute CHF exacerbation with preserved LV function  On treatment with furosemide 40 mg every 8 hours  Her weight has come down from 269pounds to 254.4 pounds  Still has a lot of pedal edema  Try to taper off steroids quickly  I did explain to the patient that she should not be taking a lot of salt that she takes now -she tells me that salt and cigarettes are her only pleasures in life and she is probably not going to give up on those  A lot nutrition services still  give her recommendations regarding nonsalty diet for CHF    Pulmonary hypertension  Pulmonary hypertension is moderate to severe  Patient needs to be on oxygen at home on chronic basis    GERD dermatitis  Was given fluconazole 150 mg once a week x4 days    Social situation  Lives alone has not had medical follow-up for a long time with  and care coordinator involvement at discharge      Plan:   Patient wants to be DNR/DNI  Nutrition services to tell the patient what other nonsalt-containing diet she could use with CHF  Need to go home on oxygen with moderate to severe pulmonary arterial hypertension -will be quite challenging in this patient who plans to continue to smoke at home  Change methylprednisolone to oral prednisone and taper off quickly  Continue IV Lasix for today  Check BMP in the morning    Diet: Combination Diet Low Saturated Fat Na <2400mg Diet, No Caffeine Diet    DVT Prophylaxis: Heparin SQ  Murphy Catheter: Not present  Code Status: No CPR- Do NOT Intubate               The patient's care was discussed with the Patient     Nick Plaza MD  Hospitalist Service  [facility]    ______________________________________________________________________    Interval History     Symptoms   Feels that the shortness of breath is better though she had spell of coughing for prolonged period of time when I went to see her    Review of Systems:   Denies any chest pain    Data reviewed today: I reviewed all medications, new labs and imaging results over the last 24 hours.     Physical Exam   Vital Signs: Temp: 96.8  F (36  C) Temp src: Axillary BP: (!) 168/80 Pulse: 90   Resp: 16 SpO2: 92 % O2 Device: Nasal cannula Oxygen Delivery: 5 LPM  Weight: 250 lbs 6.4 oz      GENERAL: Patient is not in acute distress  HEENT: EOM+,Conjunctiva is clear   NECK:  no Jugular Venous distention  HEART: S1 S2 regular Rate and Rhythm,    LUNGS: Respirations are  not laboured, Lungs have decreased  breath sounds in the lung bases to auscultation without Crepitations or Wheezing   ABDOMEN: Soft , there is no tenderness ,Bowel Sounds are  Positive   LOWER LIMBS:  Pedal Edema  Bilaterally   CNS:  Alert,  Oriented x 3, Moving all the Four Limbs     Data   Recent Labs   Lab 11/26/21  0539 11/25/21  0520 11/24/21  1044 11/23/21 2059 11/23/21 2032   WBC 4.2 5.6  --   --  6.0   HGB 15.3 14.5  --   --  15.1   MCV 92 93  --   --  92    223  --   --  242    140 140  --  139   POTASSIUM 3.7 4.0 4.2  --  4.5   CHLORIDE 94 99 102  --  104   CO2 41* 38* 32  --  30   BUN 35* 20 9  --  7   CR 1.15* 1.16* 0.89  --  0.89   ANIONGAP 4 3 6  --  5   DONA 8.2* 7.9* 8.8  --  8.2*   * 152* 139*  --  106*   ALBUMIN 2.9*  --   --   --  2.6*   PROTTOTAL 7.3  --   --   --  7.0   BILITOTAL 0.9  --   --   --  1.3   ALKPHOS 124  --   --   --  153*   ALT 13  --   --   --  14   AST 13  --   --   --  15   TROPONIN  --   --   --  <0.015  --          No results found for this or any previous visit (from the past 24 hour(s)).

## 2021-11-27 LAB
ANION GAP SERPL CALCULATED.3IONS-SCNC: 3 MMOL/L (ref 3–14)
BUN SERPL-MCNC: 47 MG/DL (ref 7–30)
CALCIUM SERPL-MCNC: 7.7 MG/DL (ref 8.5–10.1)
CHLORIDE BLD-SCNC: 92 MMOL/L (ref 94–109)
CO2 SERPL-SCNC: 42 MMOL/L (ref 20–32)
CREAT SERPL-MCNC: 1.1 MG/DL (ref 0.52–1.04)
GFR SERPL CREATININE-BSD FRML MDRD: 52 ML/MIN/1.73M2
GLUCOSE BLD-MCNC: 173 MG/DL (ref 70–99)
GLUCOSE BLDC GLUCOMTR-MCNC: 151 MG/DL (ref 70–99)
PLATELET # BLD AUTO: 193 10E3/UL (ref 150–450)
POTASSIUM BLD-SCNC: 3.8 MMOL/L (ref 3.4–5.3)
SODIUM SERPL-SCNC: 137 MMOL/L (ref 133–144)

## 2021-11-27 PROCEDURE — 250N000013 HC RX MED GY IP 250 OP 250 PS 637: Performed by: INTERNAL MEDICINE

## 2021-11-27 PROCEDURE — 250N000011 HC RX IP 250 OP 636: Performed by: INTERNAL MEDICINE

## 2021-11-27 PROCEDURE — 85049 AUTOMATED PLATELET COUNT: CPT | Performed by: INTERNAL MEDICINE

## 2021-11-27 PROCEDURE — 999N000157 HC STATISTIC RCP TIME EA 10 MIN

## 2021-11-27 PROCEDURE — 80048 BASIC METABOLIC PNL TOTAL CA: CPT | Performed by: INTERNAL MEDICINE

## 2021-11-27 PROCEDURE — 99233 SBSQ HOSP IP/OBS HIGH 50: CPT | Performed by: INTERNAL MEDICINE

## 2021-11-27 PROCEDURE — 94640 AIRWAY INHALATION TREATMENT: CPT

## 2021-11-27 PROCEDURE — 36415 COLL VENOUS BLD VENIPUNCTURE: CPT | Performed by: INTERNAL MEDICINE

## 2021-11-27 PROCEDURE — 94640 AIRWAY INHALATION TREATMENT: CPT | Mod: 76

## 2021-11-27 PROCEDURE — 250N000009 HC RX 250: Performed by: INTERNAL MEDICINE

## 2021-11-27 PROCEDURE — 120N000001 HC R&B MED SURG/OB

## 2021-11-27 RX ORDER — FUROSEMIDE 10 MG/ML
40 INJECTION INTRAMUSCULAR; INTRAVENOUS EVERY 12 HOURS
Status: COMPLETED | OUTPATIENT
Start: 2021-11-27 | End: 2021-11-28

## 2021-11-27 RX ADMIN — IPRATROPIUM BROMIDE AND ALBUTEROL SULFATE 3 ML: 2.5; .5 SOLUTION RESPIRATORY (INHALATION) at 15:59

## 2021-11-27 RX ADMIN — FUROSEMIDE 40 MG: 10 INJECTION, SOLUTION INTRAMUSCULAR; INTRAVENOUS at 02:28

## 2021-11-27 RX ADMIN — IPRATROPIUM BROMIDE AND ALBUTEROL SULFATE 3 ML: 2.5; .5 SOLUTION RESPIRATORY (INHALATION) at 07:42

## 2021-11-27 RX ADMIN — FUROSEMIDE 40 MG: 10 INJECTION, SOLUTION INTRAMUSCULAR; INTRAVENOUS at 20:53

## 2021-11-27 RX ADMIN — FUROSEMIDE 40 MG: 10 INJECTION, SOLUTION INTRAMUSCULAR; INTRAVENOUS at 08:17

## 2021-11-27 RX ADMIN — DOXYCYCLINE HYCLATE 100 MG: 100 CAPSULE ORAL at 21:00

## 2021-11-27 RX ADMIN — METHYLPREDNISOLONE SODIUM SUCCINATE 40 MG: 40 INJECTION, POWDER, FOR SOLUTION INTRAMUSCULAR; INTRAVENOUS at 20:43

## 2021-11-27 RX ADMIN — HEPARIN SODIUM 5000 UNITS: 10000 INJECTION, SOLUTION INTRAVENOUS; SUBCUTANEOUS at 08:18

## 2021-11-27 RX ADMIN — IPRATROPIUM BROMIDE AND ALBUTEROL SULFATE 3 ML: 2.5; .5 SOLUTION RESPIRATORY (INHALATION) at 19:27

## 2021-11-27 RX ADMIN — HEPARIN SODIUM 5000 UNITS: 10000 INJECTION, SOLUTION INTRAVENOUS; SUBCUTANEOUS at 21:00

## 2021-11-27 RX ADMIN — THERA TABS 1 TABLET: TAB at 08:17

## 2021-11-27 RX ADMIN — DOXYCYCLINE HYCLATE 100 MG: 100 CAPSULE ORAL at 08:16

## 2021-11-27 ASSESSMENT — ACTIVITIES OF DAILY LIVING (ADL)
ADLS_ACUITY_SCORE: 10

## 2021-11-27 NOTE — PLAN OF CARE
Up to chair this am and will ambulated in plaza as able.  Lungs dim.  02 setting down to 3L.  Occas thin sputum produced.  Education and return demo on I.S.  Volume 700.  Encouraged use every 1-2 hrs  Edema and tenderness down on tim calfs, but still obvious.  Elevation maintained  Tele SR

## 2021-11-27 NOTE — PROGRESS NOTES
Care Management Follow Up    Length of Stay (days): 3    Expected Discharge Date: 11/29/2021     Concerns to be Addressed: discharge planning,care coordination/care conferences     Patient plan of care discussed at interdisciplinary rounds: yes  Anticipated Discharge Disposition: Transitional Care  Anticipated Discharge Services: Meals on Wheels,County Worker  Anticipated Discharge DME: Oxygen  Patient/Family in Agreement with the Plan: yes    Referrals Placed by CM/SW: Post Acute Facilities    Additional Information:  [facility]. SW called the TCU to coordinate the admission. Per Mansi, the TCU is not able to do a weekend admission but can do an admission on Monday 11/29/21. The facility would like pt to arrive early in the day.    SW went to meet with pt to coordinate transportation to TCU on Monday.     Pt is refusing TCU and stated that she would like to go home. Pt was adamant about going home because she has to pay her rent and other bills and does not have anybody to do that for her. Pt reported that she does not have any friends or family and no one can assist with getting her bills paid. Previously when pt broke her arm and was in the hospital she called the  to notify them that her rent would be late due to being in the hospital; pt reported that the  was not accommodating.     SW offered to set pt up with home care as an alternative. Pt was again adamant that she did not want anyone in her home. EMI explained that pt could go to outpatient therapy. Pt reported that she has transportation barriers and needs to get set up with [employer]. EMI also encouraged pt to call her health insurance to inquire about medical taxi rides. EMI also gave pt a pamphlet for the Senior Linkage Line to coordinate additional services.     EMI gave pt the number and application for [employer] & the number for meals on wheels. Pt has a cell  phone however, it is not a smart phone.     Pt will need assistance with transportation at discharge. Pt is unsure if she would be able to get in and out of a taxi on her own. Pt is agreeable to the cost & billing process associated with [facility] [employer].     SW has not released the TCU bed at this time in case pt changed her mind about TCU.     Pt is also requesting a walker at discharge.    The RNCC will assist with scheduling with a PCP.       NITISH Bello, [facility]  Casual    [facility]  [phone]

## 2021-11-27 NOTE — PROGRESS NOTES
CTS     Post hospital follow up appointment scheduled.     Dec7 New ED/Hospital Follow Up 2:10 PM   PIERCE Negrno CNP  [place] [id number]   [address]   [facility] [address] [phone]     Aurea Mancuso RN Case Manager  Inpatient Care Coordination   [facility]   [phone]

## 2021-11-27 NOTE — PLAN OF CARE
VSS. On 5L NC. A&O. Oral prednisone changed to solumedrol due to patients allergy. Purewic in place. Tele- SR. Moving A of 1 with GB and walker. On heparin subcutaneous. Continue to monitor patient.

## 2021-11-27 NOTE — PROGRESS NOTES
[facility]  Hospitalist Progress Note  Admit 11/23/2021  8:09 PM    Name: Annalisa Webb    MRN: [id number]  Provider:  Matthew Fonseca MD, [facility]    Date of Service: 11/27/2021     Reason for Stay (Diagnosis): Acute hypoxic respiratory failure multifactorial         Summary of hospital stay & Assessment/Plan:   Summary of Stay: Annalisa Webb is a 66 year old female who was admitted on 11/23/2021  66 year old with COPD, CHF with preserved LV function, pulmonary hypertension, asthma who unfortunately continues to smoke cigarettes and lives alone and having difficulty obtaining groceries eat salt-containing hotdogs, soups and a lot of potato chips on a daily basis given to [facility] 11/24/2021 with history of syncope with a coughing spell and in the hospital and was found to have acute hypoxic respiratory failure with oxygen saturations of 75% on room air before being transported and was treated both for his COPD exacerbation and acute CHF with CT scan of the chest showing 2 small bands of old retracted pulmonary embolus, cholelithiasis without any acute pulmonary embolism and signs of small airway disease without pneumonia echo showed LVEF of 60 to 65%      Problem List:   Acute exacerbation of COPD, oxygen requirement were as high as 8 L/min, she has improved down to 4 L/min, no evidence of new acute pulmonary embolism  She is insisting that she cannot take prednisone because she is allergic I explained to her that the swelling she had with prednisone before is a side effect not an allergy and she has been on Solu-Medrol which is prednisolone without any sign of real allergic reaction.  Either way we can probably stop Solu-Medrol tomorrow    Acute on chronic diastolic congestive heart failure on intravenous Lasix, moderate to severe pulmonary hypertension  Net IO Since Admission: -5,642 mL [11/27/21 1114]  Continue diuresis and monitor electrolyte and kidney function, decrease Lasix  from every 8 to every 12 for 2 more doses, recheck BMP and BNP tomorrow  She is not on chronic home oxygen however likely will need to be on home oxygen with pulmonary hypertension, COPD and diastolic CHF, will will continue to taper for now down to 4 L/min 11/27    Social situation-Lives alone has not had medical follow-up for a long time with  and care coordinator involvement at discharge, TCU was recommended       DVT Prophylaxis: Heparin SQ  Code Status:  No CPR- Do NOT Intubate    Disposition Plan     Expected discharge in 1-2  days to transitional care unit once a bed and rehab is available.     Entered: Matthew Fonseca 11/27/2021, 11:10 AM                 Interval History:       Doing well, breathing has improved, denies any chest pain.      Today's plan detailed above discussed with nursing               Physical Exam:   Physical Exam   Temp: 98.7  F (37.1  C) Temp src: Oral BP: 134/55 Pulse: 85   Resp: 20 SpO2: 97 % O2 Device: Nasal cannula Oxygen Delivery: 4 LPM  Vitals:    11/25/21 0316 11/26/21 0300 11/27/21 0540   Weight: 120.1 kg (264 lb 12.8 oz) 113.6 kg (250 lb 6.4 oz) 118.2 kg (260 lb 9.6 oz)     I/O last 3 completed shifts:  In: 480 [P.O.:480]  Out: 1210 [Urine:1210]           GENERAL: Patient is not in acute distress  HEENT: EOM+,Conjunctiva is clear   NECK:  no Jugular Venous distention  HEART: S1 S2 regular Rate and Rhythm,    LUNGS: Respirations are  not laboured, Lungs have decreased breath sounds in the lung bases to auscultation without Wheezing   ABDOMEN: Soft , there is no tenderness ,Bowel Sounds are  Positive   LOWER LIMBS:  Pedal +2 Edema  Bilaterally, bilateral chronic venous stasis, no open ulceration    Medications       doxycycline hyclate  100 mg Oral Q12H [facility]     [START ON 12/2/2021] fluconazole  150 mg Oral Q7 Days     furosemide  40 mg Intravenous Q8H     heparin ANTICOAGULANT  5,000 Units Subcutaneous Q12H     ipratropium - albuterol 0.5 mg/2.5 mg/3 mL  3 mL  Nebulization 4x daily     methylPREDNISolone  40 mg Intravenous Q24H     multivitamin, therapeutic  1 tablet Oral Daily     perflutren diluted 1mL to 2mL with saline  3 mL Intravenous Once     sodium chloride (PF)  10 mL Intracatheter Once     sodium chloride (PF)  3 mL Intracatheter Q8H     Data     -Data reviewed today:  I personally reviewed  all new labs and imaging results over the last 24 hours.    Recent Labs   Lab 11/27/21 0624 11/26/21 0539 11/25/21 0520 11/23/21 2032   WBC  --  4.2 5.6 6.0   HGB  --  15.3 14.5 15.1   HCT  --  52.5* 50.9* 52.6*   MCV  --  92 93 92    234 223 242     Recent Labs   Lab 11/27/21 0624 11/26/21 0539 11/25/21 0520    139 140   POTASSIUM 3.8 3.7 4.0   CHLORIDE 92* 94 99   CO2 42* 41* 38*   ANIONGAP 3 4 3   * 147* 152*   BUN 47* 35* 20   CR 1.10* 1.15* 1.16*   GFRESTIMATED 52* 49* 48*   DONA 7.7* 8.2* 7.9*       No results found for this or any previous visit (from the past 24 hour(s)).    This document was produced using voice recognition software

## 2021-11-27 NOTE — PROGRESS NOTES
Notified by RN that patient has allergies to prednisone.  Patient has been receiving IV Solu-Medrol and transitioned to p.o. prednisone today.  Did not receive prednisone yet.  Will order Solu-Medrol 40 mg IV daily for now.  Further steroid dosing per rounder in the morning.

## 2021-11-27 NOTE — PROVIDER NOTIFICATION
Patient is prescribed prednisone and is allergic to prednisone. Please advise. QUIANA OWENS: Changed to solumedrol.

## 2021-11-27 NOTE — DISCHARGE INSTRUCTIONS
Your hospital follow up appointment has been scheduled for you with PIERCE Ness CNP at [facility] for Tuesday, Dec. 7th at 2:10 pm. Please bring your hospital discharge instructions, a photo ID, and insurance information with you to your appointment. Please call the clinic at [phone] if you need to reschedule.

## 2021-11-27 NOTE — PLAN OF CARE
VSS. A/O x 4. 5 L on NC weaned to 4L. Pt having infrequent episode of coughing did not want neb on this shift. Receiving solu medrol and lasix. Urine output good. Assist x 1 with gait belt and walker. Purewic in place. Tele:

## 2021-11-28 ENCOUNTER — APPOINTMENT (OUTPATIENT)
Dept: PHYSICAL THERAPY | Facility: CLINIC | Age: 66
DRG: 291 | End: 2021-11-28
Payer: COMMERCIAL

## 2021-11-28 LAB
ANION GAP SERPL CALCULATED.3IONS-SCNC: 2 MMOL/L (ref 3–14)
BUN SERPL-MCNC: 53 MG/DL (ref 7–30)
CALCIUM SERPL-MCNC: 7.4 MG/DL (ref 8.5–10.1)
CHLORIDE BLD-SCNC: 93 MMOL/L (ref 94–109)
CO2 SERPL-SCNC: 42 MMOL/L (ref 20–32)
CREAT SERPL-MCNC: 0.99 MG/DL (ref 0.52–1.04)
GFR SERPL CREATININE-BSD FRML MDRD: 59 ML/MIN/1.73M2
GLUCOSE BLD-MCNC: 171 MG/DL (ref 70–99)
NT-PROBNP SERPL-MCNC: 1676 PG/ML (ref 0–900)
POTASSIUM BLD-SCNC: 4.2 MMOL/L (ref 3.4–5.3)
SODIUM SERPL-SCNC: 137 MMOL/L (ref 133–144)

## 2021-11-28 PROCEDURE — 250N000011 HC RX IP 250 OP 636: Performed by: INTERNAL MEDICINE

## 2021-11-28 PROCEDURE — 120N000001 HC R&B MED SURG/OB

## 2021-11-28 PROCEDURE — 999N000157 HC STATISTIC RCP TIME EA 10 MIN

## 2021-11-28 PROCEDURE — 250N000009 HC RX 250: Performed by: INTERNAL MEDICINE

## 2021-11-28 PROCEDURE — 80048 BASIC METABOLIC PNL TOTAL CA: CPT | Performed by: INTERNAL MEDICINE

## 2021-11-28 PROCEDURE — 83880 ASSAY OF NATRIURETIC PEPTIDE: CPT | Performed by: INTERNAL MEDICINE

## 2021-11-28 PROCEDURE — 99232 SBSQ HOSP IP/OBS MODERATE 35: CPT | Performed by: INTERNAL MEDICINE

## 2021-11-28 PROCEDURE — 94640 AIRWAY INHALATION TREATMENT: CPT

## 2021-11-28 PROCEDURE — 36415 COLL VENOUS BLD VENIPUNCTURE: CPT | Performed by: INTERNAL MEDICINE

## 2021-11-28 PROCEDURE — 97530 THERAPEUTIC ACTIVITIES: CPT | Mod: GP

## 2021-11-28 PROCEDURE — 250N000013 HC RX MED GY IP 250 OP 250 PS 637: Performed by: INTERNAL MEDICINE

## 2021-11-28 PROCEDURE — 94640 AIRWAY INHALATION TREATMENT: CPT | Mod: 76

## 2021-11-28 RX ADMIN — METHYLPREDNISOLONE SODIUM SUCCINATE 40 MG: 40 INJECTION, POWDER, FOR SOLUTION INTRAMUSCULAR; INTRAVENOUS at 21:43

## 2021-11-28 RX ADMIN — IPRATROPIUM BROMIDE AND ALBUTEROL SULFATE 3 ML: 2.5; .5 SOLUTION RESPIRATORY (INHALATION) at 16:11

## 2021-11-28 RX ADMIN — IPRATROPIUM BROMIDE AND ALBUTEROL SULFATE 3 ML: 2.5; .5 SOLUTION RESPIRATORY (INHALATION) at 11:20

## 2021-11-28 RX ADMIN — HEPARIN SODIUM 5000 UNITS: 10000 INJECTION, SOLUTION INTRAVENOUS; SUBCUTANEOUS at 21:54

## 2021-11-28 RX ADMIN — HEPARIN SODIUM 5000 UNITS: 10000 INJECTION, SOLUTION INTRAVENOUS; SUBCUTANEOUS at 08:02

## 2021-11-28 RX ADMIN — IPRATROPIUM BROMIDE AND ALBUTEROL SULFATE 3 ML: 2.5; .5 SOLUTION RESPIRATORY (INHALATION) at 19:39

## 2021-11-28 RX ADMIN — DOXYCYCLINE HYCLATE 100 MG: 100 CAPSULE ORAL at 08:02

## 2021-11-28 RX ADMIN — IPRATROPIUM BROMIDE AND ALBUTEROL SULFATE 3 ML: 2.5; .5 SOLUTION RESPIRATORY (INHALATION) at 08:24

## 2021-11-28 RX ADMIN — DOXYCYCLINE HYCLATE 100 MG: 100 CAPSULE ORAL at 21:43

## 2021-11-28 RX ADMIN — FUROSEMIDE 40 MG: 10 INJECTION, SOLUTION INTRAMUSCULAR; INTRAVENOUS at 08:02

## 2021-11-28 RX ADMIN — THERA TABS 1 TABLET: TAB at 08:01

## 2021-11-28 ASSESSMENT — ACTIVITIES OF DAILY LIVING (ADL)
ADLS_ACUITY_SCORE: 10

## 2021-11-28 NOTE — PROGRESS NOTES
Hospitalist Medicine Progress Note   [facility]       Annlaisa Webb is a 66 year old with COPD, CHF with preserved LV function, pulmonary hypertension, asthma who unfortunately continues to smoke cigarettes and lives alone and having difficulty obtaining groceries eat salt-containing hotdogs, soups and a lot of potato chips on a daily basis given to [facility] 11/24/2021 with history of syncope with a coughing spell and in the hospital and was found to have acute hypoxic respiratory failure with oxygen saturations of 75% on room air before being transported and was treated both for his COPD exacerbation and acute CHF with CT scan of the chest showing 2 small bands of old retracted pulmonary embolus, cholelithiasis without any acute pulmonary embolism and signs of small airway disease without pneumonia echo showed LVEF of 60 to 65%       Date of Admission:  11/23/2021  Assessment & Plan     Acute exacerbation of COPD  Her oxygen requirements have been decreasing with her being on 5 L nasal cannula O2 at this time whereas earlier she was on 8 L  The signs of old pulmonary embolus without any acute pulmonary embolism  It will be difficult for treating this patient's with warfarin given the fact that she has difficulty moving around  She is on treatment with methylprednisone which will be changed to oral prednisone and tapered quickly given her CHF, duo nebs and not on antibiotics  We will start patient on doxycycline    Acute CHF exacerbation with preserved LV function  On treatment with furosemide 40 mg every 8 hours  Her weight has come down from 269pounds to 254.4 pounds  Still has a lot of pedal edema  Try to taper off steroids quickly  I did explain to the patient that she should not be taking a lot of salt that she takes now -she tells me that salt and cigarettes are her only pleasures in life and she is probably not going to give up on those  A lot nutrition services still  "give her recommendations regarding nonsalty diet for CHF    Pulmonary hypertension  Pulmonary hypertension is moderate to severe  Patient needs to be on oxygen at home on chronic basis    GERD dermatitis  Was given fluconazole 150 mg once a week x4 days    Social situation  Lives alone has not had medical follow-up for a long time with  and care coordinator involvement at discharge      Plan:   Unfortunately patient does not want to go to TCU or have home care come to her house this is associated with her having difficulty moving out of the house.  Unfortunately she wants to continue to smoke cigarettes as well as eat diet containing higher salts and says that if she starts doing this \"the Fun in her life is going to be over\".  I did tell the patient that this is dangerous for her nevertheless she understands the risk.  I also told her to take oxygen for pulmonary arterial hypertension but she says that because she is going to smoke once she gets discharged this might not be a good idea.     Discharge patient in 1 to 2 days    Diet: Combination Diet Low Saturated Fat Na <2400mg Diet, No Caffeine Diet  Snacks/Supplements Adult: Gelatein sugar-free; Between Meals    DVT Prophylaxis: Heparin SQ  Murphy Catheter: Not present  Code Status: No CPR- Do NOT Intubate               The patient's care was discussed with the Patient     Nick Plaza MD  Hospitalist Service  [facility]    ______________________________________________________________________    Interval History     Symptoms   Feels that the shortness of breath is improving continues to do so    Review of Systems:   Denies any chest pain    Data reviewed today: I reviewed all medications, new labs and imaging results over the last 24 hours.     Physical Exam   Vital Signs: Temp: 98.6  F (37  C) Temp src: Oral BP: (!) 152/71 Pulse: 78   Resp: 20 SpO2: 93 % O2 Device: Nasal cannula with humidification Oxygen Delivery: 4 " LPM  Weight: 260 lbs 9.6 oz      GENERAL: Patient is not in acute distress  HEENT: EOM+,Conjunctiva is clear   NECK:  no Jugular Venous distention  HEART: S1 S2 regular Rate and Rhythm,    LUNGS: Respirations are  not laboured, Lungs have decreased breath sounds in the lung bases to auscultation without Crepitations or Wheezing   ABDOMEN: Soft , there is no tenderness ,Bowel Sounds are  Positive   LOWER LIMBS:  Pedal Edema  Bilaterally   CNS:  Alert,  Oriented x 3, Moving all the Four Limbs     Data   Recent Labs   Lab 11/28/21  0636 11/27/21  1720 11/27/21  0624 11/26/21  0539 11/25/21  0520 11/24/21  1044 11/23/21 2059 11/23/21 2032   WBC  --   --   --  4.2 5.6  --   --  6.0   HGB  --   --   --  15.3 14.5  --   --  15.1   MCV  --   --   --  92 93  --   --  92   PLT  --   --  193 234 223  --   --  242     --  137 139 140   < >  --  139   POTASSIUM 4.2  --  3.8 3.7 4.0   < >  --  4.5   CHLORIDE 93*  --  92* 94 99   < >  --  104   CO2 42*  --  42* 41* 38*   < >  --  30   BUN 53*  --  47* 35* 20   < >  --  7   CR 0.99  --  1.10* 1.15* 1.16*   < >  --  0.89   ANIONGAP 2*  --  3 4 3   < >  --  5   DONA 7.4*  --  7.7* 8.2* 7.9*   < >  --  8.2*   * 151* 173* 147* 152*   < >  --  106*   ALBUMIN  --   --   --  2.9*  --   --   --  2.6*   PROTTOTAL  --   --   --  7.3  --   --   --  7.0   BILITOTAL  --   --   --  0.9  --   --   --  1.3   ALKPHOS  --   --   --  124  --   --   --  153*   ALT  --   --   --  13  --   --   --  14   AST  --   --   --  13  --   --   --  15   TROPONIN  --   --   --   --   --   --  <0.015  --     < > = values in this interval not displayed.         No results found for this or any previous visit (from the past 24 hour(s)).

## 2021-11-28 NOTE — PLAN OF CARE
Remains on 4L this shift and stable with this even when dozes off.  On night shift on 2L would dip in mid 80 range.  Lungs dimm and using I.S regularly

## 2021-11-28 NOTE — PLAN OF CARE
VSS with elevated BP. A/Ox4. LS dim, on 4L NC. Frequent productive cough that causes pt face to turn red and sats drop as low as 87%. Pt recovers quickly. Trace BLE edema., legs elevated. Purewick in place, changed and dahiana cares done. Tele SR. Pulsate mattress. TCU recommended for discharge.

## 2021-11-28 NOTE — PLAN OF CARE
VSS. BP was elevated but stabilized. AOx4. O2 3L NC. Ax1 w walker. LS dim. Productive cough. Sputum greenish tint. O2 drops during coughing spells and sleep. Lowest was 87%. IS teaching completed. Got up to 1000. Tele: SR. Lasix IV.

## 2021-11-29 ENCOUNTER — APPOINTMENT (OUTPATIENT)
Dept: PHYSICAL THERAPY | Facility: CLINIC | Age: 66
DRG: 291 | End: 2021-11-29
Payer: COMMERCIAL

## 2021-11-29 LAB
BACTERIA BLD CULT: ABNORMAL
BACTERIA BLD CULT: ABNORMAL
BACTERIA BLD CULT: NO GROWTH

## 2021-11-29 PROCEDURE — 250N000011 HC RX IP 250 OP 636: Performed by: INTERNAL MEDICINE

## 2021-11-29 PROCEDURE — 94640 AIRWAY INHALATION TREATMENT: CPT

## 2021-11-29 PROCEDURE — 97530 THERAPEUTIC ACTIVITIES: CPT | Mod: GP | Performed by: PHYSICAL THERAPIST

## 2021-11-29 PROCEDURE — 999N000157 HC STATISTIC RCP TIME EA 10 MIN

## 2021-11-29 PROCEDURE — 99232 SBSQ HOSP IP/OBS MODERATE 35: CPT | Performed by: INTERNAL MEDICINE

## 2021-11-29 PROCEDURE — 250N000009 HC RX 250: Performed by: INTERNAL MEDICINE

## 2021-11-29 PROCEDURE — 99222 1ST HOSP IP/OBS MODERATE 55: CPT | Performed by: NURSE PRACTITIONER

## 2021-11-29 PROCEDURE — 250N000013 HC RX MED GY IP 250 OP 250 PS 637: Performed by: INTERNAL MEDICINE

## 2021-11-29 PROCEDURE — 94640 AIRWAY INHALATION TREATMENT: CPT | Mod: 76

## 2021-11-29 PROCEDURE — 120N000001 HC R&B MED SURG/OB

## 2021-11-29 RX ADMIN — IPRATROPIUM BROMIDE AND ALBUTEROL SULFATE 3 ML: 2.5; .5 SOLUTION RESPIRATORY (INHALATION) at 18:30

## 2021-11-29 RX ADMIN — IPRATROPIUM BROMIDE AND ALBUTEROL SULFATE 3 ML: 2.5; .5 SOLUTION RESPIRATORY (INHALATION) at 07:35

## 2021-11-29 RX ADMIN — IPRATROPIUM BROMIDE AND ALBUTEROL SULFATE 3 ML: 2.5; .5 SOLUTION RESPIRATORY (INHALATION) at 19:29

## 2021-11-29 RX ADMIN — IPRATROPIUM BROMIDE AND ALBUTEROL SULFATE 3 ML: 2.5; .5 SOLUTION RESPIRATORY (INHALATION) at 11:46

## 2021-11-29 RX ADMIN — HEPARIN SODIUM 5000 UNITS: 10000 INJECTION, SOLUTION INTRAVENOUS; SUBCUTANEOUS at 08:43

## 2021-11-29 RX ADMIN — DOXYCYCLINE HYCLATE 100 MG: 100 CAPSULE ORAL at 08:43

## 2021-11-29 RX ADMIN — DOXYCYCLINE HYCLATE 100 MG: 100 CAPSULE ORAL at 19:56

## 2021-11-29 RX ADMIN — HEPARIN SODIUM 5000 UNITS: 10000 INJECTION, SOLUTION INTRAVENOUS; SUBCUTANEOUS at 19:56

## 2021-11-29 RX ADMIN — THERA TABS 1 TABLET: TAB at 08:43

## 2021-11-29 ASSESSMENT — ACTIVITIES OF DAILY LIVING (ADL)
ADLS_ACUITY_SCORE: 10
ADLS_ACUITY_SCORE: 8
ADLS_ACUITY_SCORE: 8
ADLS_ACUITY_SCORE: 10
ADLS_ACUITY_SCORE: 10
ADLS_ACUITY_SCORE: 8
ADLS_ACUITY_SCORE: 10
ADLS_ACUITY_SCORE: 8
ADLS_ACUITY_SCORE: 10
ADLS_ACUITY_SCORE: 8
ADLS_ACUITY_SCORE: 10

## 2021-11-29 NOTE — PLAN OF CARE
"BP (!) 142/61 (BP Location: Right arm)   Pulse 84   Temp 98  F (36.7  C) (Oral)   Resp 18   Wt 114.4 kg (252 lb 3.2 oz)   SpO2 93%     NEURO:A/Ox4  PAIN:Denies  TELE:SR  IV:SL RA  RESPIRATORY:LS- diminished, CERRATO, Pt refusing O2 at the beginning of the shift states \" I need to be off oxygen by tomorrow when I leave\", pt educated and encouraged to use the oxygen until we can safely get her weaned off, Pt then agreed to use the oxygen and writer applied 1 LPM per oxymask- sats low to mid 90s  GI:+BS, denies nausea  : Voids appropriately  SKIN:Scattered bruising and scabs, blanchable redness to coccyx and rash under breast bilaterally and abdominal folds, tim BLE, 2-3+ edema L>R  ACTIVITY:Ax1 gait belt and walker  DIET:Cardiac no caffeine  PLAN: Continue to wean O2 as tolerated, scheduled nebs, PO abx, discharge plan likely tomorrow, continue POC.   "

## 2021-11-29 NOTE — CONSULTS
[facility]  Palliative Care Consultation   Text Page    Assessment & Plan   Annalisa Webb is a 66 year old female who was admitted on 11/23/2021.   Consulted by Dr. Plaza to assist with symptom management, goals of care, and development of plan of care    Recommendations:  1. Goals of Care- No CPR- Do NOT Intubate-restorative cares  Hospitalization goals discussed Discussed with patient her code status, and her goals of care. She reports that her goal is to return home. She does not care if health improves, she reports that she is ok with dying but is not open to hospice or homecare as she does not want anyone in her house. She is not open to stopping smoking, changing her diet, or eating better.   Decisional Capacity- Intact. Patient does not have an advance directive. Per  informed consent policy next of kin should be involved if patient becomes unable.    2. Dyspnea  -Continue with oxygen as able  -Smoking cessation as able  -IS    3. Generalized weakness  -Appreciate the input of therapies for discharge recommendations  -Staff to assist with ADLs as patient allows    4. Spiritual Care  Oriented to Spiritual Health as part of Palliative Care team. Spiritual Health Services declined at this time.  Spiritual Background: Declined     5. Care Planning  Appreciate Care of SW for discharge planning as able.    Medical Decision Making and Goals of Care:  Discussed on November 29, 2021 with Bárbara PELAYO CNP: Met with patient in her room. Introduced self and palliative care team. Discussed and reviewed above. Patient stated she is leaving her today or tomorrow no matter what. Discussed hospice vs further hospitalization. She does not want people in her home. She reports that she will not come back to the hospital unless is found down at her apartment. She reports that she wants to live out her days, she wants to continue with smoking. Discussed the concern about rehospitalization and  "her not surviving outside of the hospital without oxygen, She reports \"I do not care if i die I need to get back home\" Discussed that I will update the team. EMI and MD updated. Questions asked and answered.    Thank you for involving us in the patient's care.     Bárbara PELAYO CNP  Pain Management and Palliative Care  [facility]  Pgr: [phone]    Time Spent on this Encounter   Total unit/floor time 68 minutes, time consisted of the following, examination of the patient, reviewing the record and completing documentation. >50% of time spent in counseling and coordination of care, Bedside Nurse Jeni and Hospitalist Dr Plaza.  Time spend counseling with patient consisted of the following topics, goals of care, education about diagnosis, education about prognosis, care planning for discharge and symptom management.    Understanding of disease process:   This has been discussed with patient.    History of Present Illness   History is obtained from the patient  Electronic medical record    Annalisa Webb is a 66 year old female with a past medical history of a asthma and is not on any medications. She really has not had much medical care, who presents with shortness of breath and syncope.       Past Medical History   I have reviewed this patient's medical history and updated it with pertinent information if needed.   Past Medical History:   Diagnosis Date     Asthma, mild intermittent        Past Surgical History   I have reviewed this patient's surgical history and updated it with pertinent information if needed.  No past surgical history on file.    Prior to Admission Medications   None     Allergies   Allergies   Allergen Reactions     Prednisone      Unknown [No Clinical Screening - See Comments] Swelling     steroids     Erythromycin Rash     Penicillins Rash     amoxicillin       Social History   I have updated and reviewed the following Social History Narrative:   Social History "     Social History Narrative     Not on file       Family History   I have reviewed this patient's family history and updated it with pertinent information if needed.   No family history on file.    Review of Systems   The 10 point Review of Systems is negative other than noted in the HPI or here.     Physical Exam   Temp:  [97.1  F (36.2  C)-98.6  F (37  C)] 98.5  F (36.9  C)  Pulse:  [72-91] 75  Resp:  [16-24] 16  BP: (121-169)/(58-88) 121/58  SpO2:  [81 %-98 %] 81 %  252 lbs 3.2 oz  Exam:  GENERAL APPEARANCE:  Alert, in no distress, cooperative  ENT:  Mouth and posterior oropharynx normal, moist mucous membranes  EYES:  EOM, conjunctivae, lids, pupils and irises normal  RESP:  respiratory effort and palpation of chest normal, lungs clear to auscultation , no respiratory distress  CV:  Palpation and auscultation of heart done , regular rate and rhythm, no murmur, rub, or gallop, peripheral edema 2+ in bilateral lower extremites  ABDOMEN:  normal bowel sounds, soft, nontender, no hepatosplenomegaly or other masses  PSYCH:  oriented X 3    Delirium Screen/CAM:  Delirium = (#1 and #2 = YES) + (#3 and/or #4)   1) Acute onset and fluctuating course:   No   (acute change in mental status from baseline over last 24 hours)  2) Inattention:   No   (difficulty focusing, distractible, can't follow conversation)  3) Disorganized thinking:   No   (score only if #1 and #2 are YES)  (rambling/irrelevant conversation, unclear/illogical thoughts, inconsistency)  4) Altered level of consciousness:   No   (score only if #1 and #2 are YES)  (other than alert, calm, cooperative)    Delirium/CAM score: 0/4  Interpretation:  1)  Delirium:  Absent    Data  I have personally reviewed all labs prior to medication changes  No results found for this or any previous visit (from the past 24 hour(s)).

## 2021-11-29 NOTE — PROGRESS NOTES
"Hospitalist Medicine Progress Note   [facility]       Annalisa Webb is a 66 year old with COPD, CHF with preserved LV function, pulmonary hypertension, asthma who unfortunately continues to smoke cigarettes and lives alone and having difficulty obtaining groceries eat salt-containing hotdogs, soups and a lot of potato chips on a daily basis given to [facility] 11/24/2021 with history of syncope with a coughing spell and in the hospital and was found to have acute hypoxic respiratory failure with oxygen saturations of 75% on room air before being transported and was treated both for his COPD exacerbation and acute CHF with CT scan of the chest showing 2 small bands of old retracted pulmonary embolus, cholelithiasis without any acute pulmonary embolism and signs of small airway disease without pneumonia echo showed LVEF of 60 to 65%.  Her weight has come down from 269 pounds to 252 pounds.  Patient wanted to go home and did not want to go to TCU.  She wants to continue smoking and says that she wants to enjoy the little life that she has without having to think about it.  She tells me that she has no plans to quitting eating salty diet and is well aware that this can cause her to come back to the hospital with CHF exacerbations.  She does not want to go home on oxygen and unfortunately her oxygen saturation dipped down to 81% on room air.  I feel patient is alert oriented and reality based when she tells me that she is \"not afraid of dying\"       Date of Admission:  11/23/2021  Assessment & Plan     Acute exacerbation of COPD  Her oxygen requirements have been decreasing with her being on 5 L nasal cannula O2 at this time whereas earlier she was on 8 L  The signs of old pulmonary embolus without any acute pulmonary embolism  It will be difficult for treating this patient's with warfarin given the fact that she has difficulty moving around  She is on treatment with " "methylprednisone which will be changed to oral prednisone and tapered quickly given her CHF, duo nebs and not on antibiotics  on doxycycline will complete 10 days of therapy  Will discontinue methylprednisone 2021 and monitor    Acute CHF exacerbation with preserved LV function  On treatment with furosemide 40 mg every 8 hours  Her weight has come down from 269 pounds to 252 pounds  Still has a lot of pedal edema  Try to taper off steroids quickly  I did explain to the patient that she should not be taking a lot of salt that she takes now -she tells me that salt and cigarettes are her only pleasures in life and she is probably not going to give up on those  A lot nutrition services still give her recommendations regarding nonsalty diet for CHF      Pulmonary hypertension  Pulmonary hypertension is moderate to severe  Patient needs to be on oxygen at home on chronic basis    GERD dermatitis  Was given fluconazole 150 mg once a week x4 days    Social situation  Lives alone has not had medical follow-up for a long time with  and care coordinator involvement at discharge      Plan:   Discharge patient home 2021 - if she doesn't want to go home on Oxygen will again discuss with Loraine armstrong and Palliative care   Discontinue Seroids  Unfortunately patient does not want to go to TCU or have home care come to her house this is associated with her having difficulty moving out of the house.  Unfortunately she wants to continue to smoke cigarettes as well as eat diet containing higher salts and says that if she starts doing this \"the Fun in her life is going to be over\".  I did tell the patient that this is dangerous for her nevertheless she understands the risk.  I also told her to take oxygen for pulmonary arterial hypertension but she says that because she is going to smoke once she gets discharged this might not be a good idea.       Diet: Combination Diet Low Saturated Fat Na <2400mg Diet, No " Caffeine Diet  Snacks/Supplements Adult: Gelatein sugar-free; Between Meals    DVT Prophylaxis: Heparin SQ  Murphy Catheter: Not present  Code Status: No CPR- Do NOT Intubate               The patient's care was discussed with the Patient     Nick Plaza MD  Hospitalist Service  [facility]    ______________________________________________________________________    Interval History     Symptoms   Feels that the shortness of breath is improving as compared to when she came in     Review of Systems:   Denies any chest pain    Data reviewed today: I reviewed all medications, new labs and imaging results over the last 24 hours.     Physical Exam   Vital Signs: Temp: 98  F (36.7  C) Temp src: Oral BP: (!) 142/61 Pulse: 84   Resp: 18 SpO2: 93 % O2 Device: Nasal cannula Oxygen Delivery: 1 LPM  Weight: 252 lbs 3.2 oz      GENERAL: Patient is not in acute distress  HEENT: EOM+,Conjunctiva is clear   NECK:  no Jugular Venous distention  HEART: S1 S2 regular Rate and Rhythm,    LUNGS: Respirations are  not laboured, Lungs have decreased breath sounds in the lung bases to auscultation without Crepitations or Wheezing   ABDOMEN: Soft , there is no tenderness ,Bowel Sounds are  Positive   LOWER LIMBS:  Pedal Edema  Bilaterally   CNS:  Alert,  Oriented x 3, Moving all the Four Limbs     Data   Recent Labs   Lab 11/28/21  0636 11/27/21  1720 11/27/21  0624 11/26/21  0539 11/25/21  0520 11/24/21  1044 11/23/21 2059 11/23/21 2032   WBC  --   --   --  4.2 5.6  --   --  6.0   HGB  --   --   --  15.3 14.5  --   --  15.1   MCV  --   --   --  92 93  --   --  92   PLT  --   --  193 234 223  --   --  242     --  137 139 140   < >  --  139   POTASSIUM 4.2  --  3.8 3.7 4.0   < >  --  4.5   CHLORIDE 93*  --  92* 94 99   < >  --  104   CO2 42*  --  42* 41* 38*   < >  --  30   BUN 53*  --  47* 35* 20   < >  --  7   CR 0.99  --  1.10* 1.15* 1.16*   < >  --  0.89   ANIONGAP 2*  --  3 4 3   < >  --  5   DONA 7.4*  --   7.7* 8.2* 7.9*   < >  --  8.2*   * 151* 173* 147* 152*   < >  --  106*   ALBUMIN  --   --   --  2.9*  --   --   --  2.6*   PROTTOTAL  --   --   --  7.3  --   --   --  7.0   BILITOTAL  --   --   --  0.9  --   --   --  1.3   ALKPHOS  --   --   --  124  --   --   --  153*   ALT  --   --   --  13  --   --   --  14   AST  --   --   --  13  --   --   --  15   TROPONIN  --   --   --   --   --   --  <0.015  --     < > = values in this interval not displayed.         No results found for this or any previous visit (from the past 24 hour(s)).

## 2021-11-29 NOTE — PLAN OF CARE
VSS. A/Ox4. LS dim, on 5L oxymask because prefers breathing through her mouth and would desat to mid-high 80s on NC. Pt wants to go for walks today to help with conditioning. Purewick in place, cares completed. Tele SR. Plan to discharge in a few days.

## 2021-11-29 NOTE — PLAN OF CARE
/58 (BP Location: Right arm)   Pulse 75   Temp 98.5  F (36.9  C) (Oral)   Resp 16   Wt 114.4 kg (252 lb 3.2 oz)   SpO2 94%     A&O. A1 GBW. Tele is SR. 3L O2 via oximask. Denies pain. Ambulated halls with PT. Plan is continue PO Doxycycline, wean O2 as able. Pt removed oximask, was sitting up in chair and oxygen saturations got as low as 81% on RA. Pt is refusing TCU and home oxygen, states she will not stop smoking and does not want to blow herself up. Will continue POC.

## 2021-11-29 NOTE — PLAN OF CARE
VSS x BP.. BP elevated. AOx4. Stable O2 sats on 4L NC. Ax1 w walker. LS dim / fine crackles. Productive cough. Sputum greenish tint. O2 drops during coughing spells & sleep. IS teaching reinforced. Got up to 1000. Tele:

## 2021-11-30 ENCOUNTER — APPOINTMENT (OUTPATIENT)
Dept: PHYSICAL THERAPY | Facility: CLINIC | Age: 66
DRG: 291 | End: 2021-11-30
Payer: COMMERCIAL

## 2021-11-30 VITALS
HEART RATE: 99 BPM | RESPIRATION RATE: 20 BRPM | WEIGHT: 251.32 LBS | TEMPERATURE: 98.4 F | DIASTOLIC BLOOD PRESSURE: 59 MMHG | OXYGEN SATURATION: 91 % | SYSTOLIC BLOOD PRESSURE: 147 MMHG

## 2021-11-30 LAB
ANION GAP SERPL CALCULATED.3IONS-SCNC: 2 MMOL/L (ref 3–14)
BUN SERPL-MCNC: 46 MG/DL (ref 7–30)
CALCIUM SERPL-MCNC: 8.5 MG/DL (ref 8.5–10.1)
CHLORIDE BLD-SCNC: 101 MMOL/L (ref 94–109)
CO2 SERPL-SCNC: 37 MMOL/L (ref 20–32)
CREAT SERPL-MCNC: 0.83 MG/DL (ref 0.52–1.04)
GFR SERPL CREATININE-BSD FRML MDRD: 73 ML/MIN/1.73M2
GLUCOSE BLD-MCNC: 126 MG/DL (ref 70–99)
PLATELET # BLD AUTO: 133 10E3/UL (ref 150–450)
POTASSIUM BLD-SCNC: 4.1 MMOL/L (ref 3.4–5.3)
SODIUM SERPL-SCNC: 140 MMOL/L (ref 133–144)

## 2021-11-30 PROCEDURE — 94640 AIRWAY INHALATION TREATMENT: CPT

## 2021-11-30 PROCEDURE — 36415 COLL VENOUS BLD VENIPUNCTURE: CPT | Performed by: INTERNAL MEDICINE

## 2021-11-30 PROCEDURE — 999N000157 HC STATISTIC RCP TIME EA 10 MIN

## 2021-11-30 PROCEDURE — 99239 HOSP IP/OBS DSCHRG MGMT >30: CPT | Performed by: INTERNAL MEDICINE

## 2021-11-30 PROCEDURE — 97530 THERAPEUTIC ACTIVITIES: CPT | Mod: GP | Performed by: PHYSICAL THERAPIST

## 2021-11-30 PROCEDURE — 94640 AIRWAY INHALATION TREATMENT: CPT | Mod: 76

## 2021-11-30 PROCEDURE — 85049 AUTOMATED PLATELET COUNT: CPT | Performed by: INTERNAL MEDICINE

## 2021-11-30 PROCEDURE — 250N000011 HC RX IP 250 OP 636: Performed by: INTERNAL MEDICINE

## 2021-11-30 PROCEDURE — 80048 BASIC METABOLIC PNL TOTAL CA: CPT | Performed by: INTERNAL MEDICINE

## 2021-11-30 PROCEDURE — 250N000013 HC RX MED GY IP 250 OP 250 PS 637: Performed by: INTERNAL MEDICINE

## 2021-11-30 PROCEDURE — 250N000009 HC RX 250: Performed by: INTERNAL MEDICINE

## 2021-11-30 RX ORDER — FLUCONAZOLE 150 MG/1
150 TABLET ORAL
Qty: 1 TABLET | Refills: 0 | Status: SHIPPED | OUTPATIENT
Start: 2021-12-02 | End: 2021-12-03

## 2021-11-30 RX ORDER — DOXYCYCLINE 100 MG/1
100 CAPSULE ORAL EVERY 12 HOURS
Qty: 12 CAPSULE | Refills: 0 | Status: SHIPPED | OUTPATIENT
Start: 2021-11-30 | End: 2021-12-06

## 2021-11-30 RX ORDER — FUROSEMIDE 20 MG
20 TABLET ORAL DAILY
Qty: 30 TABLET | Refills: 0 | Status: SHIPPED | OUTPATIENT
Start: 2021-11-30

## 2021-11-30 RX ORDER — ALBUTEROL SULFATE 90 UG/1
2 AEROSOL, METERED RESPIRATORY (INHALATION) EVERY 4 HOURS PRN
Qty: 18 G | Refills: 0 | Status: SHIPPED | OUTPATIENT
Start: 2021-11-30

## 2021-11-30 RX ORDER — POTASSIUM CHLORIDE 750 MG/1
10 CAPSULE, EXTENDED RELEASE ORAL 2 TIMES DAILY
Qty: 30 CAPSULE | Refills: 0 | Status: SHIPPED | OUTPATIENT
Start: 2021-11-30

## 2021-11-30 RX ADMIN — IPRATROPIUM BROMIDE AND ALBUTEROL SULFATE 3 ML: 2.5; .5 SOLUTION RESPIRATORY (INHALATION) at 08:02

## 2021-11-30 RX ADMIN — DOXYCYCLINE HYCLATE 100 MG: 100 CAPSULE ORAL at 08:25

## 2021-11-30 RX ADMIN — THERA TABS 1 TABLET: TAB at 08:25

## 2021-11-30 RX ADMIN — IPRATROPIUM BROMIDE AND ALBUTEROL SULFATE 3 ML: 2.5; .5 SOLUTION RESPIRATORY (INHALATION) at 11:26

## 2021-11-30 RX ADMIN — HEPARIN SODIUM 5000 UNITS: 10000 INJECTION, SOLUTION INTRAVENOUS; SUBCUTANEOUS at 08:24

## 2021-11-30 ASSESSMENT — ACTIVITIES OF DAILY LIVING (ADL)
ADLS_ACUITY_SCORE: 8

## 2021-11-30 NOTE — PLAN OF CARE
"VSS. KIANNA/Ox4. LS dim, on 1L oxymask. CERRATO. Assessed pt off O2 ambulating to the commode. Pt dropped to 78% at lowest but recovered to 87-91% quickly and stayed in that range. Educated pt on deep breathing exercises. Placed pt back on 1L oxymask. Tele SR. Pt states she \"needs to go home today\" because \"I have stuff I need to get done\". Plan to discharge today.   "

## 2021-11-30 NOTE — PLAN OF CARE
Physical Therapy Discharge Summary    Reason for therapy discharge:    Discharged to home.  Patient/family request discontinuation of services.    Progress towards therapy goal(s). See goals on Care Plan in [facility] electronic health record for goal details.  Goals not met.  Barriers to achieving goals:   discharge from facility.    Therapy recommendation(s):    Continued therapy is recommended.  Rationale/Recommendations:  Recommended TCU, however pt discharged to home.

## 2021-11-30 NOTE — PLAN OF CARE
"BP (!) 147/59 (BP Location: Right arm)   Pulse 99   Temp 98.4  F (36.9  C) (Oral)   Resp 20   Wt 114 kg (251 lb 5.2 oz)   SpO2 91%     VSS. A/Ox4. LS dim, on 1L oxymask. CERRATO. Tele SR. PT requested to leave AMA this shift, but upon learning that ride services will not be provided, PT became agitated and demanding that a ride be provided because she \"has no money.\" PT was advised that MD would speak with her but PT is unwilling to wait. PT left via private cab against medical advice at 1500. Belongings returned, PT refused to review discharge summary.       "

## 2021-11-30 NOTE — DISCHARGE SUMMARY
[facility]  Hospitalist Discharge Summary      Date of Admission:  11/23/2021  Date of Discharge:  11/30/2021  Discharging Provider: Nick Plaza MD      Discharge Diagnoses   Acute exacerbation of COPD  Acute exacerbation of CHF with preserved LV function  Pulmonary arterial hypertension moderate to severe  GERD  Fungal infection of skin      Follow-ups Needed After Discharge   Follow-up Appointments     Follow-up and recommended labs and tests       Follow up with primary care provider, Physician No Ref-Primary, within 7   days for hospital follow- up.  The following labs/tests are recommended:   ABG, BMP with chronic respiratory failure as well as Lasix therapy for   CHF.             Unresulted Labs Ordered in the Past 30 Days of this Admission     Date and Time Order Name Status Description    11/26/2021 12:02 AM Blood Culture Hand, Right Preliminary       These results will be followed up by Primary care physician      Discharge Disposition   Discharged to home  AGAINST MEDICAL ADVICE  Condition at discharge: Serious      Hospital Course   Annalisa Webb is a 66 year old with COPD, CHF with preserved LV function, pulmonary hypertension, asthma who unfortunately continues to smoke cigarettes and lives alone and having difficulty obtaining groceries eat salt-containing hotdogs, soups and a lot of potato chips on a daily basis given to [facility] 11/24/2021 with history of syncope with a coughing spell and in the hospital and was found to have acute hypoxic respiratory failure with oxygen saturations of 75% on room air before being transported and was treated both for his COPD exacerbation and acute CHF with CT scan of the chest showing 2 small bands of old retracted pulmonary embolus, cholelithiasis without any acute pulmonary embolism and signs of small airway disease without pneumonia echo showed LVEF of 60 to 65%.  Her weight has come down from 269 pounds to 252 pounds.   "Patient wanted to go home and did not want to go to TCU.  She wants to continue smoking and says that she wants to enjoy the little life that she has without having to think about it.  She tells me that she has no plans to quitting eating salty diet and is well aware that this can cause her to come back to the hospital with CHF exacerbations.  She does not want to go home on oxygen and unfortunately her oxygen saturation dipped down to 81% on room air.  I feel patient is alert oriented and reality based when she tells me that she is \"not afraid of dying\" she wants to get out of the hospital before the first of the month so that she is able to pay her bills going home.  She will be discharged AGAINST MEDICAL ADVICE she is fine with this.     Consultations This Hospital Stay   WOUND OSTOMY CONTINENCE NURSE  IP CONSULT  ADVANCE DIRECTIVE IP CONSULT  CARE MANAGEMENT / SOCIAL WORK IP CONSULT  SOCIAL WORK IP CONSULT  PHYSICAL THERAPY ADULT IP CONSULT  NUTRITION SERVICES ADULT IP CONSULT  PALLIATIVE CARE ADULT IP CONSULT    Code Status   No CPR- Do NOT Intubate    Time Spent on this Encounter   I, Nick Plaza MD, personally saw the patient today and spent greater than 30 minutes discharging this patient.       Nick Plaza MD  [place] [address] [facility]  [address] [place] [address]  Phone: [phone]  Fax: [phone]  ______________________________________________________________________    Physical Exam   Vital Signs: Temp: 98.4  F (36.9  C) Temp src: Oral BP: (!) 147/59 Pulse: 99   Resp: 20 SpO2: 91 % O2 Device: Oxymask Oxygen Delivery: 2 LPM  Weight: 251 lbs 5.19 oz  GENERAL: Patient is not in acute distress  HEENT: EOM+,Conjunctiva is clear   NECK:  no Jugular Venous distention  HEART: S1 S2 regular Rate and Rhythm,    LUNGS: Respirations are  not laboured, Lungs have decreased breath sounds in the lung bases to auscultation without Crepitations or Wheezing   ABDOMEN: Soft , " there is no tenderness ,Bowel Sounds are  Positive   LOWER LIMBS:  Pedal Edema  Bilaterally   CNS:  Alert,  Oriented x 3, Moving all the Four Limbs        Primary Care Physician   Physician No Ref-Primary    Discharge Orders      Follow-up and recommended labs and tests     Follow up with primary care provider, Physician No Ref-Primary, within 7 days for hospital follow- up.  The following labs/tests are recommended: ABG, BMP with chronic respiratory failure as well as Lasix therapy for CHF.     Activity    Your activity upon discharge: activity as tolerated     Reason for your hospital stay    Patient came in with lower extremity swelling as well as shortness of breath was treated for COPD exacerbation and CHF.  She has acute and probably also chronic respiratory failure and would require oxygen to go home.  But patient is quite clear that she wants to continue to smoke and that oxygenation will be dangerous for her.  She does not want to take any oxygen therapy and wanted to go home before the first of the month as she has to pay bills.  She understands clearly that this is dangerous to her life and she could die with this but states that she is comfortable and not anxious about dying.     Discharge Instructions    Please do not smoke cigarettes  Please come back to the emergency department by calling 911 if you get short of breath or start feeling confused  Please avoid salt-containing diet and restrict sodium intake to less than 2400 mg/day     Miscellaneous DME Order    DME Documentation:   Describe the reason for need to support medical necessity: Patient has lower extremity swelling and shortness of breath making it difficult for her to walk.     I, the undersigned, certify that the above prescribed supplies are medically necessary for this patient and is both reasonable and necessary in reference to accepted standards of medical and necessary in reference to accepted standards of medical practice in the  treatment of this patient's condition and is not prescribed as a convenience.     Diet    Follow this diet upon discharge: Orders Placed This Encounter      Snacks/Supplements Adult: Gelatein sugar-free; Between Meals      Combination Diet Low Saturated Fat Na <2400mg Diet, No Caffeine Diet       Significant Results and Procedures   Most Recent 3 CBC's:Recent Labs   Lab Test 11/30/21 0527 11/27/21 0624 11/26/21 0539 11/25/21 0520 11/23/21 2032   WBC  --   --  4.2 5.6 6.0   HGB  --   --  15.3 14.5 15.1   MCV  --   --  92 93 92   * 193 234 223 242     Most Recent 3 BMP's:Recent Labs   Lab Test 11/30/21 0527 11/28/21 0636 11/27/21  1720 11/27/21 0624    137  --  137   POTASSIUM 4.1 4.2  --  3.8   CHLORIDE 101 93*  --  92*   CO2 37* 42*  --  42*   BUN 46* 53*  --  47*   CR 0.83 0.99  --  1.10*   ANIONGAP 2* 2*  --  3   DONA 8.5 7.4*  --  7.7*   * 171* 151* 173*     Most Recent 2 LFT's:Recent Labs   Lab Test 11/26/21 0539 11/23/21 2032   AST 13 15   ALT 13 14   ALKPHOS 124 153*   BILITOTAL 0.9 1.3     Most Recent 3 INR's:No lab results found.  Most Recent 3 Troponin's:Recent Labs   Lab Test 11/23/21 2059   TROPONIN <0.015     Most Recent Urinalysis:No lab results found.,   Results for orders placed or performed during the hospital encounter of 11/23/21   Head CT w/o contrast    Narrative    EXAM: CT HEAD W/O CONTRAST  LOCATION: [facility]  DATE/TIME: 11/23/2021 10:33 PM    INDICATION: Fall, head trauma w/ syncope  COMPARISON: 03/02/2014  TECHNIQUE: Routine CT Head without IV contrast. Multiplanar reformats. Dose reduction techniques were used.    FINDINGS:  INTRACRANIAL CONTENTS: No intracranial hemorrhage, extraaxial collection, or mass effect.  No CT evidence of acute infarct. Chronic encephalomalacia and gliosis in the right parietal lobe and lateral right frontal lobe. Mild presumed chronic small vessel   ischemic changes. Normal ventricles and sulci.      VISUALIZED ORBITS/SINUSES/MASTOIDS: No intraorbital abnormality. Polypoid mucosal thickening in the maxillary sinuses with mild ethmoid mucosal thickening. No middle ear or mastoid effusion.    BONES/SOFT TISSUES: No acute calvarial fracture. Left temporalis muscle hematoma.      Impression    IMPRESSION:  1.  No CT evidence for acute intracranial hemorrhage or calvarial fracture.  2.  Chronic changes as above.   CT Chest Pulmonary Embolism w Contrast    Narrative    EXAM: CT CHEST PULMONARY EMBOLISM W CONTRAST  LOCATION: [facility]  DATE/TIME: 11/23/2021 10:34 PM    INDICATION: Syncope. Generalized weakness. Cough.  COMPARISON: 3/3/2014.  TECHNIQUE: CT chest pulmonary angiogram during arterial phase injection of IV contrast. Multiplanar reformats and MIP reconstructions were performed. Dose reduction techniques were used.   CONTRAST: 80 mL [id number]    FINDINGS:  ANGIOGRAM CHEST: There is a nonocclusive web-like embolus in a left lower lobe pulmonary artery. Minimal web like embolus in a branch of the right lower lobe pulmonary artery. No acute occlusive pulmonary emboli. Thoracic aorta is negative for   dissection. The heart is at the upper limits of normal in size.    LUNGS AND PLEURA: Mild nodular probable scarring the right upper lobe laterally. Few geographic areas of air trapping primarily in the upper lobes. No pneumothorax or pleural effusion.    MEDIASTINUM/AXILLAE: There is a 3 cm nodule at the inferior aspect of the left thyroid lobe extending into the superior mediastinum. Enlarged lymph node or soft tissue nodule in the inferior left axilla measuring 2.3 x 1.2 cm. Few mildly enlarged right   axillary lymph nodes. No mediastinal or hilar lymph node enlargement.    CORONARY ARTERY CALCIFICATION: Mild.    UPPER ABDOMEN: Stone in the gallbladder.    MUSCULOSKELETAL: Degenerative disease in the spine.      Impression    IMPRESSION:  1.  No acute pulmonary embolus.  2.  2 small  bands of old retracted pulmonary embolus.  3.  No aortic aneurysm or dissection.  4.  Few areas of air trapping in the lungs bilaterally consistent with small airways disease.  5.  Few mildly enlarged right bilateral axillary lymph nodes.  6.  Cholelithiasis.   POC US ECHO LIMITED    Impression    [facility] Procedure Note      Limited Bedside ED Cardiac Ultrasound:    PROCEDURE: PERFORMED BY: Dr. Nic Brito MD  INDICATIONS/SYMPTOM:  Hypoxia  PROBE: Cardiac phased array probe  BODY LOCATION: Chest  FINDINGS:   The ultrasound was performed utilizing the apical 4 chamber views.  Cardiac contractility:  Present  Gross estimation of cardiac kinesis: Technically difficult and unable to assess  Pericardial Effusion:  None  RV:LV ratio: LV > RV                                                  INTERPRETATION:    Technically difficult bedside ultrasound. No pericardial effusion. Unable to assess cardiac kinesis.   IMAGE DOCUMENTATION: Images were archived to hard drive.         Echocardiogram Complete     Value    LVEF  60-65%    Narrative    [id number]  [id number]  [id number]  [id number]^[id number]^WALLACE     [facility]  Echocardiography Laboratory  [address] [place] [address]     Name: MICHAEL HESTER  MRN: [id number]  : 1955  Study Date: 2021 08:34 AM  Age: 66 yrs  Gender: Female  Patient Location: [facility]  Reason For Study: SOB  Ordering Physician: IVETTE HAINES  Performed By: Miley Donis     HR: 93  BP: 136/67 mmHg  ______________________________________________________________________________  Procedure  Complete Portable Echo Adult. Optison (NDC #2029-8338) given intravenously.  ______________________________________________________________________________  Interpretation Summary     Technically very difficult study.  1. Left ventricular systolic function is normal. The visual ejection fraction  is 60-65%.  2. The right ventricle is not well visualized; grossly  normal size and  function.  3. There is mild (1+) tricuspid regurgitation.  4. Moderate to severe pulmonary hypertension; The right ventricular systolic  pressure is approximated at 53mmHg plus the right atrial pressure. IVC  diameter <2.1 cm collapsing >50% with sniff suggests a normal RA pressure of 3  mmHg.  ______________________________________________________________________________  Left Ventricle  The left ventricle is normal in size. Left ventricular systolic function is  normal. The visual ejection fraction is 60-65%. Grade I or early diastolic  dysfunction. No regional wall motion abnormalities noted.     Right Ventricle  The right ventricle is not well visualized. The right ventricle is normal in  size and function.     Atria  The left atrium is not well visualized. Right atrium not well visualized.     Mitral Valve  There is mild mitral annular calcification. The mitral valve is not well  visualized. There is trace mitral regurgitation.     Tricuspid Valve  The tricuspid valve is not well visualized. The tricuspid valve is not well  visualized, but is grossly normal. There is mild (1+) tricuspid regurgitation.  The right ventricular systolic pressure is approximated at 53mmHg plus the  right atrial pressure.     Aortic Valve  The aortic valve is trileaflet with aortic valve sclerosis. The aortic valve  is not well visualized.     Pulmonic Valve  The pulmonic valve is not well visualized. The pulmonic valve is not well  seen, but is grossly normal.     Vessels  Normal size aorta. IVC diameter <2.1 cm collapsing >50% with sniff suggests a  normal RA pressure of 3 mmHg.     Pericardium  There is no pericardial effusion.     Rhythm  Sinus rhythm was noted.     ______________________________________________________________________________  MMode/2D Measurements & Calculations  IVSd: 1.2 cm  LVIDd: 4.0 cm  LVIDs: 2.0 cm  LVPWd: 1.0 cm  FS: 50.0 %  LV mass(C)d: 147.6 grams     LA dimension: 3.8 cm  asc Aorta  Diam: 3.5 cm  LVOT diam: 1.8 cm  LVOT area: 2.5 cm2  LA Volume (BP): 69.0 ml  RWT: 0.52     Doppler Measurements & Calculations  MV E max mary: 115.0 cm/sec  MV A max mary: 127.0 cm/sec  MV E/A: 0.91  MV max P.2 mmHg  MV mean P.0 mmHg  MV V2 VTI: 28.2 cm  MVA(VTI): 2.2 cm2  MV P1/2t max mary: 126.0 cm/sec  MV P1/2t: 56.8 msec  MVA(P1/2t): 3.9 cm2  MV dec slope: 650.0 cm/sec2  MV dec time: 0.16 sec  LV V1 max P.5 mmHg  LV V1 max: 146.0 cm/sec  LV V1 VTI: 24.9 cm  SV(LVOT): 63.4 ml  TR max mary: 365.0 cm/sec  TR max P.3 mmHg     ______________________________________________________________________________  Report approved by: Sarita Baptiste 2021 02:56 PM               Discharge Medications   Current Discharge Medication List      START taking these medications    Details   albuterol (PROAIR HFA/PROVENTIL HFA/VENTOLIN HFA) 108 (90 Base) MCG/ACT inhaler Inhale 2 puffs into the lungs every 4 hours as needed for shortness of breath / dyspnea or wheezing  Qty: 18 g, Refills: 0    Comments: Pharmacy may dispense brand covered by insurance (Proair, or proventil or ventolin or generic albuterol inhaler)  Associated Diagnoses: Exacerbation of asthma, unspecified asthma severity, unspecified whether persistent      doxycycline hyclate (VIBRAMYCIN) 100 MG capsule Take 1 capsule (100 mg) by mouth every 12 hours for 6 days  Qty: 12 capsule, Refills: 0    Associated Diagnoses: Exacerbation of asthma, unspecified asthma severity, unspecified whether persistent      fluconazole (DIFLUCAN) 150 MG tablet Take 1 tablet (150 mg) by mouth every 7 days for 1 day  Qty: 1 tablet, Refills: 0    Associated Diagnoses: Intertrigo      furosemide (LASIX) 20 MG tablet Take 1 tablet (20 mg) by mouth daily  Qty: 30 tablet, Refills: 0    Associated Diagnoses: Acute congestive heart failure, unspecified heart failure type (H)      perflutren diluted 1mL to 2mL with saline (OPTISON) SUSP diluted injection Inject 3 mLs into  the vein once for 1 dose  Qty: 9 mL, Refills: 0    Associated Diagnoses: Dry eyes      potassium chloride ER (MICRO-K) 10 MEQ CR capsule Take 1 capsule (10 mEq) by mouth 2 times daily  Qty: 30 capsule, Refills: 0    Associated Diagnoses: Acute congestive heart failure, unspecified heart failure type (H)           Allergies   Allergies   Allergen Reactions     Prednisone      Unknown [No Clinical Screening - See Comments] Swelling     steroids     Erythromycin Rash     Penicillins Rash     amoxicillin

## 2021-12-01 LAB — BACTERIA BLD CULT: NO GROWTH

## 2021-12-10 ENCOUNTER — DOCUMENTATION ONLY (OUTPATIENT)
Dept: OTHER | Facility: CLINIC | Age: 66
End: 2021-12-10
Payer: COMMERCIAL